# Patient Record
Sex: FEMALE | Race: WHITE | Employment: UNEMPLOYED | URBAN - METROPOLITAN AREA
[De-identification: names, ages, dates, MRNs, and addresses within clinical notes are randomized per-mention and may not be internally consistent; named-entity substitution may affect disease eponyms.]

---

## 2017-08-16 ENCOUNTER — HOSPITAL ENCOUNTER (EMERGENCY)
Age: 27
Discharge: HOME | End: 2017-08-16
Attending: EMERGENCY MEDICINE
Payer: COMMERCIAL

## 2017-08-16 VITALS
SYSTOLIC BLOOD PRESSURE: 126 MMHG | HEART RATE: 85 BPM | RESPIRATION RATE: 14 BRPM | HEIGHT: 63 IN | DIASTOLIC BLOOD PRESSURE: 89 MMHG | WEIGHT: 230 LBS | TEMPERATURE: 97 F | BODY MASS INDEX: 40.75 KG/M2 | OXYGEN SATURATION: 94 %

## 2017-08-16 DIAGNOSIS — N39.0 URINARY TRACT INFECTION WITH HEMATURIA, SITE UNSPECIFIED: Primary | ICD-10-CM

## 2017-08-16 DIAGNOSIS — R31.9 URINARY TRACT INFECTION WITH HEMATURIA, SITE UNSPECIFIED: Primary | ICD-10-CM

## 2017-08-16 LAB
-: ABNORMAL
AMORPHOUS: ABNORMAL
BACTERIA: ABNORMAL
BILIRUBIN URINE: NEGATIVE
CASTS UA: ABNORMAL /LPF (ref 0–8)
COLOR: ABNORMAL
COMMENT UA: ABNORMAL
CRYSTALS, UA: ABNORMAL /HPF
EPITHELIAL CELLS UA: ABNORMAL /HPF (ref 0–5)
GLUCOSE URINE: NEGATIVE
HCG(URINE) PREGNANCY TEST: NEGATIVE
KETONES, URINE: NEGATIVE
LEUKOCYTE ESTERASE, URINE: ABNORMAL
MUCUS: ABNORMAL
NITRITE, URINE: POSITIVE
OTHER OBSERVATIONS UA: ABNORMAL
PH UA: 5 (ref 5–8)
PROTEIN UA: ABNORMAL
RBC UA: ABNORMAL /HPF (ref 0–4)
RENAL EPITHELIAL, UA: ABNORMAL /HPF
SPECIFIC GRAVITY UA: 1.02 (ref 1–1.03)
TRICHOMONAS: ABNORMAL
TURBIDITY: ABNORMAL
URINE HGB: ABNORMAL
UROBILINOGEN, URINE: NORMAL
WBC UA: ABNORMAL /HPF (ref 0–5)
YEAST: ABNORMAL

## 2017-08-16 PROCEDURE — 87077 CULTURE AEROBIC IDENTIFY: CPT

## 2017-08-16 PROCEDURE — 81001 URINALYSIS AUTO W/SCOPE: CPT

## 2017-08-16 PROCEDURE — 99283 EMERGENCY DEPT VISIT LOW MDM: CPT

## 2017-08-16 PROCEDURE — 86403 PARTICLE AGGLUT ANTBDY SCRN: CPT

## 2017-08-16 PROCEDURE — 84703 CHORIONIC GONADOTROPIN ASSAY: CPT

## 2017-08-16 PROCEDURE — 6370000000 HC RX 637 (ALT 250 FOR IP): Performed by: EMERGENCY MEDICINE

## 2017-08-16 PROCEDURE — 87086 URINE CULTURE/COLONY COUNT: CPT

## 2017-08-16 RX ORDER — PHENAZOPYRIDINE HYDROCHLORIDE 100 MG/1
100 TABLET, FILM COATED ORAL 3 TIMES DAILY PRN
COMMUNITY
End: 2017-08-16

## 2017-08-16 RX ORDER — FENOFIBRATE 160 MG/1
160 TABLET ORAL DAILY
COMMUNITY

## 2017-08-16 RX ORDER — CEPHALEXIN 500 MG/1
500 CAPSULE ORAL ONCE
Status: COMPLETED | OUTPATIENT
Start: 2017-08-16 | End: 2017-08-16

## 2017-08-16 RX ORDER — CEPHALEXIN 500 MG/1
500 CAPSULE ORAL 2 TIMES DAILY
Qty: 14 CAPSULE | Refills: 0 | Status: SHIPPED | OUTPATIENT
Start: 2017-08-16 | End: 2017-08-23

## 2017-08-16 RX ORDER — PHENAZOPYRIDINE HYDROCHLORIDE 100 MG/1
100 TABLET, FILM COATED ORAL 3 TIMES DAILY PRN
Qty: 10 TABLET | Refills: 0 | Status: SHIPPED | OUTPATIENT
Start: 2017-08-16 | End: 2017-09-27

## 2017-08-16 RX ADMIN — CEPHALEXIN 500 MG: 500 CAPSULE ORAL at 04:16

## 2017-08-16 ASSESSMENT — PAIN DESCRIPTION - DESCRIPTORS: DESCRIPTORS: DISCOMFORT

## 2017-08-16 ASSESSMENT — PAIN SCALES - GENERAL: PAINLEVEL_OUTOF10: 6

## 2017-08-16 ASSESSMENT — PAIN DESCRIPTION - PAIN TYPE: TYPE: ACUTE PAIN

## 2017-08-17 ASSESSMENT — ENCOUNTER SYMPTOMS
COLOR CHANGE: 0
NAUSEA: 0
BACK PAIN: 0
DIARRHEA: 0
CONSTIPATION: 0
ABDOMINAL PAIN: 0
VOMITING: 0

## 2017-08-18 LAB
CULTURE: ABNORMAL
Lab: ABNORMAL
SPECIMEN DESCRIPTION: ABNORMAL
STATUS: ABNORMAL

## 2017-09-27 ENCOUNTER — HOSPITAL ENCOUNTER (EMERGENCY)
Age: 27
Discharge: HOME | End: 2017-09-27
Attending: EMERGENCY MEDICINE
Payer: COMMERCIAL

## 2017-09-27 VITALS
HEART RATE: 102 BPM | RESPIRATION RATE: 14 BRPM | WEIGHT: 230 LBS | DIASTOLIC BLOOD PRESSURE: 89 MMHG | SYSTOLIC BLOOD PRESSURE: 122 MMHG | TEMPERATURE: 98.6 F | BODY MASS INDEX: 40.74 KG/M2 | OXYGEN SATURATION: 96 %

## 2017-09-27 DIAGNOSIS — N39.0 URINARY TRACT INFECTION WITH HEMATURIA, SITE UNSPECIFIED: Primary | ICD-10-CM

## 2017-09-27 DIAGNOSIS — R31.9 URINARY TRACT INFECTION WITH HEMATURIA, SITE UNSPECIFIED: Primary | ICD-10-CM

## 2017-09-27 LAB
-: ABNORMAL
AMORPHOUS: ABNORMAL
BACTERIA: ABNORMAL
BILIRUBIN URINE: NEGATIVE
CASTS UA: ABNORMAL /LPF (ref 0–8)
COLOR: ABNORMAL
COMMENT UA: ABNORMAL
CRYSTALS, UA: ABNORMAL /HPF
EPITHELIAL CELLS UA: ABNORMAL /HPF (ref 0–5)
GLUCOSE URINE: NEGATIVE
HCG(URINE) PREGNANCY TEST: NEGATIVE
KETONES, URINE: NEGATIVE
LEUKOCYTE ESTERASE, URINE: ABNORMAL
MUCUS: ABNORMAL
NITRITE, URINE: POSITIVE
OTHER OBSERVATIONS UA: ABNORMAL
PH UA: 5.5 (ref 5–8)
PROTEIN UA: NEGATIVE
RBC UA: ABNORMAL /HPF (ref 0–4)
RENAL EPITHELIAL, UA: ABNORMAL /HPF
SPECIFIC GRAVITY UA: 1.01 (ref 1–1.03)
TRICHOMONAS: ABNORMAL
TURBIDITY: ABNORMAL
URINE HGB: ABNORMAL
UROBILINOGEN, URINE: NORMAL
WBC UA: ABNORMAL /HPF (ref 0–5)
YEAST: ABNORMAL

## 2017-09-27 PROCEDURE — 99283 EMERGENCY DEPT VISIT LOW MDM: CPT

## 2017-09-27 PROCEDURE — 87086 URINE CULTURE/COLONY COUNT: CPT

## 2017-09-27 PROCEDURE — 81001 URINALYSIS AUTO W/SCOPE: CPT

## 2017-09-27 PROCEDURE — 84703 CHORIONIC GONADOTROPIN ASSAY: CPT

## 2017-09-27 PROCEDURE — 87186 SC STD MICRODIL/AGAR DIL: CPT

## 2017-09-27 PROCEDURE — 87077 CULTURE AEROBIC IDENTIFY: CPT

## 2017-09-27 PROCEDURE — 86403 PARTICLE AGGLUT ANTBDY SCRN: CPT

## 2017-09-27 PROCEDURE — 6370000000 HC RX 637 (ALT 250 FOR IP): Performed by: EMERGENCY MEDICINE

## 2017-09-27 RX ORDER — PHENAZOPYRIDINE HYDROCHLORIDE 100 MG/1
200 TABLET, FILM COATED ORAL ONCE
Status: COMPLETED | OUTPATIENT
Start: 2017-09-27 | End: 2017-09-27

## 2017-09-27 RX ORDER — PHENAZOPYRIDINE HYDROCHLORIDE 200 MG/1
200 TABLET, FILM COATED ORAL 3 TIMES DAILY PRN
Qty: 6 TABLET | Refills: 0 | Status: SHIPPED | OUTPATIENT
Start: 2017-09-27 | End: 2017-09-30

## 2017-09-27 RX ORDER — NITROFURANTOIN 25; 75 MG/1; MG/1
100 CAPSULE ORAL 2 TIMES DAILY
Qty: 10 CAPSULE | Refills: 0 | Status: SHIPPED | OUTPATIENT
Start: 2017-09-27 | End: 2017-10-02

## 2017-09-27 RX ORDER — NITROFURANTOIN 25; 75 MG/1; MG/1
100 CAPSULE ORAL ONCE
Status: COMPLETED | OUTPATIENT
Start: 2017-09-27 | End: 2017-09-27

## 2017-09-27 RX ADMIN — PHENAZOPYRIDINE HYDROCHLORIDE 200 MG: 100 TABLET ORAL at 04:37

## 2017-09-27 RX ADMIN — NITROFURANTOIN MONOHYDRATE AND NITROFURANTOIN MACROCRYSTALLINE 100 MG: 75; 25 CAPSULE ORAL at 04:37

## 2017-09-27 ASSESSMENT — ENCOUNTER SYMPTOMS
NAUSEA: 0
COUGH: 0
DIARRHEA: 0
SHORTNESS OF BREATH: 0
ABDOMINAL PAIN: 0
BACK PAIN: 0
CONSTIPATION: 0
VOMITING: 0
BLOOD IN STOOL: 0

## 2017-09-29 LAB
CULTURE: ABNORMAL
Lab: ABNORMAL
ORGANISM: ABNORMAL
SPECIMEN DESCRIPTION: ABNORMAL
STATUS: ABNORMAL

## 2018-04-21 ENCOUNTER — HOSPITAL ENCOUNTER (EMERGENCY)
Age: 28
Discharge: HOME | End: 2018-04-22
Payer: COMMERCIAL

## 2018-04-21 VITALS
SYSTOLIC BLOOD PRESSURE: 107 MMHG | HEART RATE: 95 BPM | TEMPERATURE: 97.8 F | BODY MASS INDEX: 42.1 KG/M2 | RESPIRATION RATE: 16 BRPM | WEIGHT: 237.6 LBS | HEIGHT: 63 IN | DIASTOLIC BLOOD PRESSURE: 65 MMHG | OXYGEN SATURATION: 100 %

## 2018-04-21 DIAGNOSIS — N39.0 URINARY TRACT INFECTION WITHOUT HEMATURIA, SITE UNSPECIFIED: ICD-10-CM

## 2018-04-21 DIAGNOSIS — R11.0 NAUSEA: Primary | ICD-10-CM

## 2018-04-21 LAB
-: ABNORMAL
ABSOLUTE EOS #: 0.1 K/UL (ref 0–0.4)
ABSOLUTE IMMATURE GRANULOCYTE: ABNORMAL K/UL (ref 0–0.3)
ABSOLUTE LYMPH #: 3.5 K/UL (ref 1–4.8)
ABSOLUTE MONO #: 0.9 K/UL (ref 0.2–0.8)
ALBUMIN SERPL-MCNC: 4.2 G/DL (ref 3.5–5.2)
ALBUMIN/GLOBULIN RATIO: ABNORMAL (ref 1–2.5)
ALP BLD-CCNC: 36 U/L (ref 35–104)
ALT SERPL-CCNC: 25 U/L (ref 5–33)
AMORPHOUS: ABNORMAL
AMYLASE: 22 U/L (ref 28–100)
ANION GAP SERPL CALCULATED.3IONS-SCNC: 12 MMOL/L (ref 9–17)
AST SERPL-CCNC: 17 U/L
BACTERIA: ABNORMAL
BASOPHILS # BLD: 1 % (ref 0–2)
BASOPHILS ABSOLUTE: 0.1 K/UL (ref 0–0.2)
BILIRUB SERPL-MCNC: 0.16 MG/DL (ref 0.3–1.2)
BILIRUBIN URINE: NEGATIVE
BILIRUBIN, POC: NORMAL
BLOOD URINE, POC: NORMAL
BUN BLDV-MCNC: 15 MG/DL (ref 6–20)
BUN/CREAT BLD: 21 (ref 9–20)
CALCIUM SERPL-MCNC: 8.9 MG/DL (ref 8.6–10.4)
CASTS UA: ABNORMAL /LPF
CHLORIDE BLD-SCNC: 102 MMOL/L (ref 98–107)
CHP ED QC CHECK: YES
CHP ED QC CHECK: YES
CLARITY, POC: NORMAL
CO2: 25 MMOL/L (ref 20–31)
COLOR, POC: YELLOW
COLOR: YELLOW
COMMENT UA: ABNORMAL
CREAT SERPL-MCNC: 0.7 MG/DL (ref 0.5–0.9)
CRYSTALS, UA: ABNORMAL /HPF
DIFFERENTIAL TYPE: ABNORMAL
EOSINOPHILS RELATIVE PERCENT: 1 % (ref 1–4)
EPITHELIAL CELLS UA: ABNORMAL /HPF (ref 0–5)
GFR AFRICAN AMERICAN: >60 ML/MIN
GFR NON-AFRICAN AMERICAN: >60 ML/MIN
GFR SERPL CREATININE-BSD FRML MDRD: ABNORMAL ML/MIN/{1.73_M2}
GFR SERPL CREATININE-BSD FRML MDRD: ABNORMAL ML/MIN/{1.73_M2}
GLUCOSE BLD-MCNC: 107 MG/DL (ref 70–99)
GLUCOSE URINE, POC: NORMAL
GLUCOSE URINE: NEGATIVE
HCT VFR BLD CALC: 42.9 % (ref 36–46)
HEMOGLOBIN: 14.2 G/DL (ref 12–16)
IMMATURE GRANULOCYTES: ABNORMAL %
KETONES, POC: NORMAL
KETONES, URINE: NEGATIVE
LEUKOCYTE EST, POC: NORMAL
LEUKOCYTE ESTERASE, URINE: NEGATIVE
LIPASE: 17 U/L (ref 13–60)
LYMPHOCYTES # BLD: 32 % (ref 24–44)
MCH RBC QN AUTO: 30.3 PG (ref 26–34)
MCHC RBC AUTO-ENTMCNC: 33.2 G/DL (ref 31–37)
MCV RBC AUTO: 91.2 FL (ref 80–100)
MONOCYTES # BLD: 8 % (ref 1–7)
MUCUS: ABNORMAL
NITRITE, POC: POSITIVE
NITRITE, URINE: POSITIVE
NRBC AUTOMATED: ABNORMAL PER 100 WBC
OTHER OBSERVATIONS UA: ABNORMAL
PDW BLD-RTO: 12 % (ref 11.5–14.5)
PH UA: 6.5 (ref 5–8)
PH, POC: 7
PLATELET # BLD: 321 K/UL (ref 130–400)
PLATELET ESTIMATE: ABNORMAL
PMV BLD AUTO: ABNORMAL FL (ref 6–12)
POTASSIUM SERPL-SCNC: 3.8 MMOL/L (ref 3.7–5.3)
PREGNANCY TEST URINE, POC: NORMAL
PROTEIN UA: NEGATIVE
PROTEIN, POC: NORMAL
RBC # BLD: 4.71 M/UL (ref 4–5.2)
RBC # BLD: ABNORMAL 10*6/UL
RBC UA: ABNORMAL /HPF (ref 0–2)
RENAL EPITHELIAL, UA: ABNORMAL /HPF
SEG NEUTROPHILS: 58 % (ref 36–66)
SEGMENTED NEUTROPHILS ABSOLUTE COUNT: 6.2 K/UL (ref 1.8–7.7)
SODIUM BLD-SCNC: 139 MMOL/L (ref 135–144)
SPECIFIC GRAVITY UA: 1.02 (ref 1–1.03)
SPECIFIC GRAVITY, POC: 1.03
TOTAL PROTEIN: 7.3 G/DL (ref 6.4–8.3)
TRICHOMONAS: ABNORMAL
TURBIDITY: CLEAR
URINE HGB: NEGATIVE
UROBILINOGEN, POC: 1
UROBILINOGEN, URINE: NORMAL
WBC # BLD: 10.8 K/UL (ref 3.5–11)
WBC # BLD: ABNORMAL 10*3/UL
WBC UA: ABNORMAL /HPF (ref 0–5)
YEAST: ABNORMAL

## 2018-04-21 PROCEDURE — 85025 COMPLETE CBC W/AUTO DIFF WBC: CPT

## 2018-04-21 PROCEDURE — 84703 CHORIONIC GONADOTROPIN ASSAY: CPT

## 2018-04-21 PROCEDURE — 87077 CULTURE AEROBIC IDENTIFY: CPT

## 2018-04-21 PROCEDURE — 99283 EMERGENCY DEPT VISIT LOW MDM: CPT

## 2018-04-21 PROCEDURE — 87086 URINE CULTURE/COLONY COUNT: CPT

## 2018-04-21 PROCEDURE — 82150 ASSAY OF AMYLASE: CPT

## 2018-04-21 PROCEDURE — 81001 URINALYSIS AUTO W/SCOPE: CPT

## 2018-04-21 PROCEDURE — 6360000002 HC RX W HCPCS: Performed by: PHYSICIAN ASSISTANT

## 2018-04-21 PROCEDURE — 83690 ASSAY OF LIPASE: CPT

## 2018-04-21 PROCEDURE — 80053 COMPREHEN METABOLIC PANEL: CPT

## 2018-04-21 PROCEDURE — 87186 SC STD MICRODIL/AGAR DIL: CPT

## 2018-04-21 RX ORDER — ONDANSETRON 4 MG/1
4 TABLET, ORALLY DISINTEGRATING ORAL EVERY 8 HOURS PRN
Qty: 20 TABLET | Refills: 0 | Status: ON HOLD | OUTPATIENT
Start: 2018-04-21 | End: 2021-08-11

## 2018-04-21 RX ORDER — ONDANSETRON 4 MG/1
4 TABLET, ORALLY DISINTEGRATING ORAL ONCE
Status: COMPLETED | OUTPATIENT
Start: 2018-04-21 | End: 2018-04-21

## 2018-04-21 RX ORDER — CIPROFLOXACIN 500 MG/1
500 TABLET, FILM COATED ORAL 2 TIMES DAILY
Qty: 20 TABLET | Refills: 0 | Status: SHIPPED | OUTPATIENT
Start: 2018-04-21 | End: 2018-05-01

## 2018-04-21 RX ADMIN — ONDANSETRON 4 MG: 4 TABLET, ORALLY DISINTEGRATING ORAL at 23:04

## 2018-04-22 RX ORDER — FLUCONAZOLE 150 MG/1
150 TABLET ORAL ONCE
Qty: 1 TABLET | Refills: 1 | Status: SHIPPED | OUTPATIENT
Start: 2018-04-22 | End: 2018-04-22

## 2018-04-23 LAB
CULTURE: ABNORMAL
CULTURE: ABNORMAL
HCG, PREGNANCY URINE (POC): NEGATIVE
Lab: ABNORMAL
ORGANISM: ABNORMAL
SPECIMEN DESCRIPTION: ABNORMAL
SPECIMEN DESCRIPTION: ABNORMAL
STATUS: ABNORMAL

## 2019-04-14 ENCOUNTER — HOSPITAL ENCOUNTER (EMERGENCY)
Age: 29
Discharge: HOME | End: 2019-04-14
Attending: EMERGENCY MEDICINE
Payer: COMMERCIAL

## 2019-04-14 VITALS
HEART RATE: 98 BPM | DIASTOLIC BLOOD PRESSURE: 89 MMHG | OXYGEN SATURATION: 99 % | BODY MASS INDEX: 43.41 KG/M2 | TEMPERATURE: 97.7 F | WEIGHT: 245 LBS | SYSTOLIC BLOOD PRESSURE: 125 MMHG | HEIGHT: 63 IN | RESPIRATION RATE: 19 BRPM

## 2019-04-14 DIAGNOSIS — R10.9 RIGHT FLANK PAIN: Primary | ICD-10-CM

## 2019-04-14 LAB
-: ABNORMAL
AMORPHOUS: ABNORMAL
BACTERIA: ABNORMAL
BILIRUBIN URINE: NEGATIVE
CASTS UA: ABNORMAL /LPF (ref 0–8)
COLOR: YELLOW
CRYSTALS, UA: ABNORMAL /HPF
EPITHELIAL CELLS UA: ABNORMAL /HPF (ref 0–5)
GLUCOSE URINE: NEGATIVE
HCG(URINE) PREGNANCY TEST: NEGATIVE
KETONES, URINE: NEGATIVE
LEUKOCYTE ESTERASE, URINE: ABNORMAL
MUCUS: ABNORMAL
NITRITE, URINE: NEGATIVE
OTHER OBSERVATIONS UA: ABNORMAL
PH UA: 7 (ref 5–8)
PROTEIN UA: NEGATIVE
RBC UA: ABNORMAL /HPF (ref 0–4)
RENAL EPITHELIAL, UA: ABNORMAL /HPF
SPECIFIC GRAVITY UA: 1.02 (ref 1–1.03)
TRICHOMONAS: ABNORMAL
TURBIDITY: ABNORMAL
URINE HGB: NEGATIVE
UROBILINOGEN, URINE: NORMAL
WBC UA: ABNORMAL /HPF (ref 0–5)
YEAST: ABNORMAL

## 2019-04-14 PROCEDURE — 81001 URINALYSIS AUTO W/SCOPE: CPT

## 2019-04-14 PROCEDURE — 84703 CHORIONIC GONADOTROPIN ASSAY: CPT

## 2019-04-14 PROCEDURE — 99284 EMERGENCY DEPT VISIT MOD MDM: CPT

## 2019-04-14 PROCEDURE — 87086 URINE CULTURE/COLONY COUNT: CPT

## 2019-04-14 RX ORDER — CEPHALEXIN 500 MG/1
500 CAPSULE ORAL 2 TIMES DAILY
Qty: 20 CAPSULE | Refills: 0 | Status: SHIPPED | OUTPATIENT
Start: 2019-04-14 | End: 2019-04-24

## 2019-04-14 ASSESSMENT — PAIN DESCRIPTION - ORIENTATION: ORIENTATION: RIGHT

## 2019-04-14 ASSESSMENT — PAIN DESCRIPTION - LOCATION: LOCATION: FLANK

## 2019-04-14 ASSESSMENT — PAIN DESCRIPTION - PROGRESSION: CLINICAL_PROGRESSION: NOT CHANGED

## 2019-04-14 ASSESSMENT — PAIN DESCRIPTION - DESCRIPTORS: DESCRIPTORS: ACHING;BURNING

## 2019-04-14 ASSESSMENT — ENCOUNTER SYMPTOMS
COUGH: 0
BLOOD IN STOOL: 0
CONSTIPATION: 0
WHEEZING: 0
NAUSEA: 0
BACK PAIN: 0
ABDOMINAL PAIN: 0
SHORTNESS OF BREATH: 0
VOMITING: 0
SORE THROAT: 0
DIARRHEA: 0

## 2019-04-14 ASSESSMENT — PAIN DESCRIPTION - PAIN TYPE: TYPE: ACUTE PAIN

## 2019-04-14 ASSESSMENT — PAIN SCALES - GENERAL: PAINLEVEL_OUTOF10: 8

## 2019-04-14 ASSESSMENT — PAIN DESCRIPTION - FREQUENCY: FREQUENCY: CONTINUOUS

## 2019-04-14 ASSESSMENT — PAIN DESCRIPTION - ONSET: ONSET: GRADUAL

## 2019-04-15 ENCOUNTER — HOSPITAL ENCOUNTER (EMERGENCY)
Age: 29
Discharge: HOME | End: 2019-04-15
Attending: EMERGENCY MEDICINE
Payer: COMMERCIAL

## 2019-04-15 VITALS
SYSTOLIC BLOOD PRESSURE: 135 MMHG | OXYGEN SATURATION: 99 % | TEMPERATURE: 97.2 F | DIASTOLIC BLOOD PRESSURE: 94 MMHG | RESPIRATION RATE: 17 BRPM | HEART RATE: 100 BPM

## 2019-04-15 DIAGNOSIS — S39.012A STRAIN OF LUMBAR REGION, INITIAL ENCOUNTER: Primary | ICD-10-CM

## 2019-04-15 DIAGNOSIS — N30.01 ACUTE CYSTITIS WITH HEMATURIA: ICD-10-CM

## 2019-04-15 LAB
-: ABNORMAL
AMORPHOUS: ABNORMAL
BACTERIA: ABNORMAL
BILIRUBIN URINE: NEGATIVE
CASTS UA: ABNORMAL /LPF (ref 0–8)
COLOR: YELLOW
COMMENT UA: ABNORMAL
CRYSTALS, UA: ABNORMAL /HPF
CULTURE: NORMAL
EPITHELIAL CELLS UA: ABNORMAL /HPF (ref 0–5)
GLUCOSE URINE: NEGATIVE
KETONES, URINE: NEGATIVE
LEUKOCYTE ESTERASE, URINE: ABNORMAL
Lab: NORMAL
MUCUS: ABNORMAL
NITRITE, URINE: NEGATIVE
OTHER OBSERVATIONS UA: ABNORMAL
PH UA: 6.5 (ref 5–8)
PROTEIN UA: ABNORMAL
RBC UA: ABNORMAL /HPF (ref 0–4)
RENAL EPITHELIAL, UA: ABNORMAL /HPF
SPECIFIC GRAVITY UA: 1.01 (ref 1–1.03)
SPECIMEN DESCRIPTION: NORMAL
TRICHOMONAS: ABNORMAL
TURBIDITY: ABNORMAL
URINE HGB: ABNORMAL
UROBILINOGEN, URINE: NORMAL
WBC UA: ABNORMAL /HPF (ref 0–5)
YEAST: ABNORMAL

## 2019-04-15 PROCEDURE — 6370000000 HC RX 637 (ALT 250 FOR IP): Performed by: PHYSICIAN ASSISTANT

## 2019-04-15 PROCEDURE — 99283 EMERGENCY DEPT VISIT LOW MDM: CPT

## 2019-04-15 PROCEDURE — 87086 URINE CULTURE/COLONY COUNT: CPT

## 2019-04-15 PROCEDURE — 81001 URINALYSIS AUTO W/SCOPE: CPT

## 2019-04-15 RX ORDER — IBUPROFEN 800 MG/1
800 TABLET ORAL ONCE
Status: COMPLETED | OUTPATIENT
Start: 2019-04-15 | End: 2019-04-15

## 2019-04-15 RX ORDER — IBUPROFEN 800 MG/1
800 TABLET ORAL EVERY 8 HOURS PRN
Qty: 30 TABLET | Refills: 0 | Status: SHIPPED | OUTPATIENT
Start: 2019-04-15

## 2019-04-15 RX ORDER — CEPHALEXIN 250 MG/1
500 CAPSULE ORAL ONCE
Status: COMPLETED | OUTPATIENT
Start: 2019-04-15 | End: 2019-04-15

## 2019-04-15 RX ORDER — CYCLOBENZAPRINE HCL 10 MG
10 TABLET ORAL NIGHTLY PRN
Qty: 15 TABLET | Refills: 0 | Status: SHIPPED | OUTPATIENT
Start: 2019-04-15 | End: 2019-04-25

## 2019-04-15 RX ORDER — CYCLOBENZAPRINE HCL 10 MG
10 TABLET ORAL ONCE
Status: COMPLETED | OUTPATIENT
Start: 2019-04-15 | End: 2019-04-15

## 2019-04-15 RX ADMIN — CEPHALEXIN 500 MG: 250 CAPSULE ORAL at 22:12

## 2019-04-15 RX ADMIN — IBUPROFEN 800 MG: 800 TABLET, FILM COATED ORAL at 22:12

## 2019-04-15 RX ADMIN — CYCLOBENZAPRINE HYDROCHLORIDE 10 MG: 10 TABLET, FILM COATED ORAL at 22:12

## 2019-04-15 ASSESSMENT — PAIN DESCRIPTION - PAIN TYPE: TYPE: ACUTE PAIN

## 2019-04-15 ASSESSMENT — PAIN DESCRIPTION - DESCRIPTORS: DESCRIPTORS: CRAMPING

## 2019-04-15 ASSESSMENT — PAIN DESCRIPTION - LOCATION: LOCATION: BACK

## 2019-04-15 ASSESSMENT — PAIN SCALES - GENERAL
PAINLEVEL_OUTOF10: 10
PAINLEVEL_OUTOF10: 10

## 2019-04-15 ASSESSMENT — PAIN DESCRIPTION - ORIENTATION: ORIENTATION: RIGHT;LEFT

## 2019-04-15 NOTE — ED PROVIDER NOTES
[facility]     Emergency Department     Faculty Attestation    I performed a history and physical examination of the patient and discussed management with the resident. I reviewed the residents note and agree with the documented findings and plan of care. Any areas of disagreement are noted on the chart. I was personally present for the key portions of any procedures. I have documented in the chart those procedures where I was not present during the key portions. I have reviewed the emergency nurses triage note. I agree with the chief complaint, past medical history, past surgical history, allergies, medications, social and family history as documented unless otherwise noted below. For Physician Assistant/ Nurse Practitioner cases/documentation I have personally evaluated this patient and have completed at least one if not all key elements of the E/M (history, physical exam, and MDM). Additional findings are as noted. Patient presents complaining of right-sided flank pain that started today. Patient is currently being treated with Cipro for a UTI. She denies fever, chills, nausea or vomiting. Patient says that her dysuria has resolved. On exam, patient is resting comfortably in the bed. Lungs clear to auscultation bilaterally and heart sounds are normal.  Abdomen is soft and nontender. No rebound or guarding is present. There is mild right CVA tenderness.   We'll check a urinalysis and pregnancy and reassess      Frankie Rider MD  Attending Emergency  Physician              Ronald Canela MD  04/14/19 3914

## 2019-04-15 NOTE — PROGRESS NOTES
31 yo F w/ hx of recurrent UTI, PCOS, PID (2014), and HSV with current active lesions presenting with R flank pain x1day. Pt reports that this morning she experienced intermittent 8/10 non-radiating R flank pain. She is currently being treated for a UTI with cipro (started on 04/08/19). She denies fever, chills, N/V/D, urinary frequency, vaginal discharge, and hematuria. She denies any hx of nephrolithiasis. She reports 1 PPD tobacco use but denies alcohol and drug use. Reported LMP was 2-3 years ago. Pt reports last pap smear in January was normal however she was found to have bacterial vaginosis at the time and was treated accordingly.

## 2019-04-15 NOTE — ED NOTES
Pt up to use the bathroom at this time  Urine sent to lab via tube system       Tanner Mukherjee RN  04/14/19 1560

## 2019-04-15 NOTE — ED PROVIDER NOTES
[facility] ED  Emergency Department Encounter  EmergencyMedicine Resident     Pt Maryjo Genao  MRN: [id number]  [place] 1990  Date of evaluation: 4/14/19  PCP:  HILDA Root CNP    CHIEF COMPLAINT       Chief Complaint   Patient presents with    Flank Pain       HISTORY OF PRESENT ILLNESS  (Location/Symptom, Timing/Onset, Context/Setting, Quality, Duration, Modifying Factors, Severity.)      Diana Griffin is a 30 y.o. female who presents with primary complaint that she is having some right flank pain. Approximately a week ago she was seen by her PCP who diagnosed her with urinary tract infection and she was started on Cipro. At that time she was having suprapubic tenderness. She denies any dysuria or increased frequency. She denies any hematuria or vaginal discharge. She does have to herpetic lesion that is known. Bedside ultrasound doesn't reveal any acute hydronephrosis. Patient admits to mild more cramping pain not sharp all achy pain. Patient denies any abdominal pain. Patient admits the suprapubic pain has improved since starting the Cipro. Main reason she presents today is because she is now having some right flank pain. Patient denies any fevers or chills. Vitals are stable she is in no acute distress. She denies any shortness of breath chest pain or abdominal pain. PAST MEDICAL / SURGICAL / SOCIAL / FAMILY HISTORY      has a past medical history of Depression, Herpes, PCOS (polycystic ovarian syndrome), PID, and STD (female). has a past surgical history that includes Tonsillectomy and adenoidectomy (1994).     Social History     Socioeconomic History    Marital status:      Spouse name: Not on file    Number of children: Not on file    Years of education: Not on file    Highest education level: Not on file   Occupational History    Not on file   Social Needs    Financial resource strain: Not on file    Food insecurity: Worry: Not on file     Inability: Not on file    Transportation needs:     Medical: Not on file     Non-medical: Not on file   Tobacco Use    Smoking status: Current Every Day Smoker     Packs/day: 1.00     Types: Cigarettes    Smokeless tobacco: Never Used   Substance and Sexual Activity    Alcohol use: Yes     Comment: social    Drug use: No    Sexual activity: Yes     Partners: Male     Comment: Doesn't use condoms, has been on depo in the past.    Lifestyle    Physical activity:     Days per week: Not on file     Minutes per session: Not on file    Stress: Not on file   Relationships    Social connections:     Talks on phone: Not on file     Gets together: Not on file     Attends Restoration service: Not on file     Active member of club or organization: Not on file     Attends meetings of clubs or organizations: Not on file     Relationship status: Not on file    Intimate partner violence:     Fear of current or ex partner: Not on file     Emotionally abused: Not on file     Physically abused: Not on file     Forced sexual activity: Not on file   Other Topics Concern    Not on file   Social History Narrative    Lives with boyfriend. Works at [employer] Finished 9th grade. Family History   Problem Relation Age of Onset    Stroke Paternal Grandmother         TIA    Diabetes Paternal Grandmother     Diabetes Maternal Grandmother     Breast Cancer Neg Hx     Colon Cancer Neg Hx     Uterine Cancer Neg Hx     Ovarian Cancer Neg Hx     Clotting Disorder Neg Hx        Allergies:  Amoxicillin and Benadryl [diphenhydramine]    Home Medications:  Prior to Admission medications    Medication Sig Start Date End Date Taking?  Authorizing Provider   cephALEXin (KEFLEX) 500 MG capsule Take 1 capsule by mouth 2 times daily for 10 days 4/14/19 4/24/19 Yes Rebecca Dietrich, DO   ondansetron (ZOFRAN ODT) 4 MG disintegrating tablet Take 1 tablet by mouth every 8 hours as needed for Nausea 4/21/18   Shekhar Lombardi PA-C   ATORVASTATIN CALCIUM PO Take by mouth    Historical Provider, MD   fenofibrate 160 MG tablet Take 160 mg by mouth daily    Historical Provider, MD       REVIEW OF SYSTEMS    (2-9 systems for level 4, 10 or more for level 5)      Review of Systems   Constitutional: Negative for diaphoresis and fever. HENT: Negative for sore throat. Respiratory: Negative for cough, shortness of breath and wheezing. Cardiovascular: Negative for chest pain, palpitations and leg swelling. Gastrointestinal: Negative for abdominal pain, blood in stool, constipation, diarrhea, nausea and vomiting. Genitourinary: Positive for flank pain. Negative for dysuria, frequency and hematuria. Musculoskeletal: Negative for back pain and neck pain. Skin: Negative for rash. Neurological: Negative for dizziness and headaches. Hematological: Does not bruise/bleed easily. PHYSICAL EXAM   (up to 7 for level 4, 8 or more for level 5)      INITIAL VITALS:   /89   Pulse 98   Temp 97.7 °F (36.5 °C) (Oral)   Resp 19   Ht 5' 3\" (1.6 m)   Wt 245 lb (111.1 kg)   SpO2 99%   BMI 43.40 kg/m²     Physical Exam   Constitutional: She is oriented to person, place, and time. She appears well-developed and well-nourished. HENT:   Head: Normocephalic and atraumatic. Eyes: Pupils are equal, round, and reactive to light. Right eye exhibits no discharge. Left eye exhibits no discharge. Neck: Normal range of motion. Cardiovascular: Normal rate, regular rhythm and normal heart sounds. Exam reveals no gallop and no friction rub. No murmur heard. Pulmonary/Chest: No respiratory distress. She has no wheezes. She has no rales. She exhibits no tenderness. Abdominal: Soft. Bowel sounds are normal. She exhibits no distension and no mass. There is no tenderness. There is no rebound and no guarding.    Genitourinary:   Genitourinary Comments: Patient has some minor CVA tenderness on the right side. With some minor flank pain. No kilo abdominal pain on exam.   Musculoskeletal: Normal range of motion. She exhibits no edema, tenderness or deformity. Neurological: She is alert and oriented to person, place, and time. Skin: Skin is warm, dry and intact. No rash noted. She is not diaphoretic. No erythema. No pallor. Psychiatric: She has a normal mood and affect. Her speech is normal and behavior is normal. Judgment and thought content normal. Cognition and memory are normal.       DIFFERENTIAL  DIAGNOSIS     PLAN (LABS / Marifer Du / EKG):  Orders Placed This Encounter   Procedures    Urine Culture    Urinalysis with Microscopic    Pregnancy, Urine       MEDICATIONS ORDERED:  Orders Placed This Encounter   Medications    cephALEXin (KEFLEX) 500 MG capsule     Sig: Take 1 capsule by mouth 2 times daily for 10 days     Dispense:  20 capsule     Refill:  0       DDX: Herniated disc, AAA rupture, pyelonephritis, kidney stone, musculoskeletal pain, retroperitoneal hematoma, pancreatitis, splenic rupture, pneumonia         DIAGNOSTIC RESULTS / EMERGENCY DEPARTMENT COURSE / MDM     LABS:  Results for orders placed or performed during the hospital encounter of 04/14/19   Urinalysis with Microscopic   Result Value Ref Range    Color, UA YELLOW YELLOW    Turbidity UA CLOUDY (A) CLEAR    Glucose, Ur NEGATIVE NEGATIVE    Bilirubin Urine NEGATIVE NEGATIVE    Ketones, Urine NEGATIVE NEGATIVE    Specific Gravity, UA 1.016 1.005 - 1.030    Urine Hgb NEGATIVE NEGATIVE    pH, UA 7.0 5.0 - 8.0    Protein, UA NEGATIVE NEGATIVE    Urobilinogen, Urine Normal Normal    Nitrite, Urine NEGATIVE NEGATIVE    Leukocyte Esterase, Urine MODERATE (A) NEGATIVE    -          WBC, UA 10 TO 20 0 - 5 /HPF    RBC, UA None 0 - 4 /HPF    Casts UA  0 - 8 /LPF     0 TO 2 HYALINE Reference range defined for non-centrifuged specimen.     Crystals UA NOT REPORTED None /HPF    Epithelial Cells UA 10 TO 20 0 - 5 /HPF    Renal Epithelial, Urine NOT REPORTED 0 /HPF    Bacteria, UA FEW (A) None    Mucus, UA NOT REPORTED None    Trichomonas, UA NOT REPORTED None    Amorphous, UA NOT REPORTED None    Other Observations UA NOT REPORTED NOT REQ. Yeast, UA NOT REPORTED None   Pregnancy, Urine   Result Value Ref Range    HCG(Urine) Pregnancy Test NEGATIVE NEGATIVE       RADIOLOGY:     RENAL ULTRASOUND:      A limited, bedside ultrasound of the kidneys was performed. The medical necessity was to evaluate for hydronephrosis in a patient with possible renal colic. The structures studied were the kidney, including the renal cortex and collecting system. FINDINGS:    negative for hydronephrosis. The study was technically adequate. IMAGES:  Electronically uploaded to the PACS system        MDM/EMERGENCY DEPARTMENT COURSE:      ED Course as of Apr 14 2153   Sun Apr 14, 2019 2050 Patient has had 6 days of Cipro therapy, her suprapubic tenderness has resolved however she is now having some right flank pain and concern for early pyelo-. Patient's vitals are stable she is afebrile she is not taken any antipyretics. Bedside ultrasound reveals no acute hide her. Lower suspicion for acute nephrolithiasis at this time. Plan for urinalysis including urine pregnancy. We'll potentially add another antibiotic if urinalysis appears acutely infected. [KW]   2146 Plan for treatment with Keflex while patient discontinue the Cipro. If she continues to have urinary tract infection. While patient follow-up in a few days with primary care provider.    [KW]   2147 Patient started to return if she develops fevers, if she develops weakness fatigue. Or is unable to tolerate by mouth. [KW]      ED Course User Index  [KW] Laura Heredia DO         PROCEDURES:  None    CONSULTS:  None    CRITICAL CARE:  None    FINAL IMPRESSION      1.  Right flank pain          DISPOSITION / PLAN     DISPOSITION     PATIENT REFERRED TO:  Martha Leslie, APRN - CNP  [address]  [phone]    Schedule an appointment as soon as possible for a visit   For Follow Up      DISCHARGE MEDICATIONS:  New Prescriptions    CEPHALEXIN (KEFLEX) 500 MG CAPSULE    Take 1 capsule by mouth 2 times daily for 10 days       Richie Miles DO  Emergency Medicine Resident    (Please note that portions of this note were completed with a voicerecognition program.  Efforts were made to edit the dictations but occasionally words are mis-transcribed.)       Richie Miles DO  04/14/19 2150       Richie Miles DO  04/14/19 2152

## 2019-04-15 NOTE — ED NOTES
Pt arrived to ed via self, pt walked to bed. C/O: right flank pain, pt states she was recently diagnosed with a UTI and has been on antibiotics, pt states she has had multiple UTI's but it has never caused flank pain, pt states no N/V/D. No blurry vision, pt states she does have burning with urination. PT NAD at this time, non labored breathing.       Miguel Alejandra RN  04/14/19 2100

## 2019-04-16 LAB
CULTURE: NORMAL
Lab: NORMAL
SPECIMEN DESCRIPTION: NORMAL

## 2019-04-16 NOTE — ED PROVIDER NOTES
[facility]     Emergency Department     Faculty Attestation    I performed a history and physical examination of the patient and discussed management with the resident. I reviewed the residents note and agree with the documented findings and plan of care. Any areas of disagreement are noted on the chart. I was personally present for the key portions of any procedures. I have documented in the chart those procedures where I was not present during the key portions. I have reviewed the emergency nurses triage note. I agree with the chief complaint, past medical history, past surgical history, allergies, medications, social and family history as documented unless otherwise noted below. For Physician Assistant/ Nurse Practitioner cases/documentation I have personally evaluated this patient and have completed at least one if not all key elements of the E/M (history, physical exam, and MDM). Additional findings are as noted. Pain isolated to the left SI joint, no CVA tenderness, abdomen soft and nontender, no radiation of the pain in the lower extremities.       Fani No MD  04/15/19 4079

## 2019-04-16 NOTE — ED PROVIDER NOTES
[address]  ED  eMERGENCY dEPARTMENT eNCOUnter      Pt Name: Bushra Valenzuela  MRN: [id number]  [place] 1990  Date of evaluation: 4/15/2019  Provider: AMINA Hickey    CHIEF COMPLAINT       Chief Complaint   Patient presents with    Back Pain     seen yesterday for a UTI           HISTORY OF PRESENT ILLNESS  (Location/Symptom, Timing/Onset, Context/Setting, Quality, Duration, Modifying Factors, Severity.)   Bushra Valenzuela is a 30 y.o. female who presents to the emergency department complaining of b/l low back pain. States was recently treated with abx by pcp for uti. Was seen here yesterday with same complaint and given keflex but didn't  the rx. States pain is worse and presented by to the ED . Denies any fevers, chills, abd pain, vomiting, c/o mild nausea. Urine preg yesterday was negative. C/o worsening low back pain, worse on right > left      Nursing Notes were reviewed. REVIEW OF SYSTEMS    (2-9 systems for level 4, 10 or more for level 5)     Review of Systems   Constitutional: Negative. HENT: Negative. Eyes: Negative. Respiratory: Negative. Cardiovascular: Negative. Gastrointestinal: mild nausea, no vomiting. Musculoskeletal: Negative. Genitourinary: no dysuria, no vaginal bleeding, no vaginal discharge. Skin: Negative. Except as noted above the remainder of the review of systems was reviewed and negative. PAST MEDICAL HISTORY     Past Medical History:   Diagnosis Date    Depression     No psych treatment or therapy currently, has been seen by psych in the past    Herpes     type 1 and 2     PCOS (polycystic ovarian syndrome) 2005    PID 12/22/2014 12/22/2014 : Treated with out patient therapy (rocephin, doxy, flagyl).  Seen at [facility] ED.     STD (female) 2005    gonorrhea, trich, chlamydia  - all treated     None otherwise stated in nurses notes    SURGICAL HISTORY       Past Surgical History:   Procedure Laterality Date    TONSILLECTOMY AND ADENOIDECTOMY  1994     None otherwise stated in nurses notes    CURRENT MEDICATIONS       Previous Medications    ATORVASTATIN CALCIUM PO    Take by mouth    CEPHALEXIN (KEFLEX) 500 MG CAPSULE    Take 1 capsule by mouth 2 times daily for 10 days    FENOFIBRATE 160 MG TABLET    Take 160 mg by mouth daily    ONDANSETRON (ZOFRAN ODT) 4 MG DISINTEGRATING TABLET    Take 1 tablet by mouth every 8 hours as needed for Nausea       ALLERGIES     Amoxicillin and Benadryl [diphenhydramine]    FAMILY HISTORY           Problem Relation Age of Onset    Stroke Paternal Grandmother         TIA    Diabetes Paternal Grandmother     Diabetes Maternal Grandmother     Breast Cancer Neg Hx     Colon Cancer Neg Hx     Uterine Cancer Neg Hx     Ovarian Cancer Neg Hx     Clotting Disorder Neg Hx      Family Status   Relation Name Status    PGM  (Not Specified)    MGM  (Not Specified)    Neg Hx  (Not Specified)      None otherwise stated in nurses notes    SOCIAL HISTORY      reports that she has been smoking cigarettes. She has been smoking about 1.00 pack per day. She has never used smokeless tobacco. She reports that she drinks alcohol. She reports that she does not use drugs. lives at home with others     PHYSICAL EXAM    (up to 7 for level 4, 8 or more for level 5)     ED Triage Vitals [04/15/19 2058]   BP Temp Temp src Pulse Resp SpO2 Height Weight   (!) 142/100 97.2 °F (36.2 °C) -- 104 17 99 % -- --       Physical Exam   Nursing note and vitals reviewed. Constitutional: Oriented to person, place, and time and well-developed, well-nourished. Head: Normocephalic and atraumatic. Ear: External ears normal.   Nose: Nose normal and midline. Eyes: Conjunctivae and EOM are normal. Pupils are equal, round, and reactive to light. Neck: Normal range of motion. Cardiovascular: Normal rate, regular rhythm, normal heart sounds and intact distal pulses.     Pulmonary/Chest: Effort normal and breath sounds normal. No respiratory distress. No wheezes. No rales. No chest tenderness. Abdominal: Soft. Bowel sounds are normal. No distension and no mass. Mild suprapubic abdominal tenderness. There is no rebound and no guarding. no CVA tenderness bilaterally. Musculoskeletal: Normal range of motion. no midline tenderness. Neurological: Alert and oriented to person, place, and time. Gait normal. GCS score is 15. Skin: Skin is warm and dry. No rash noted. No erythema. No pallor. DIAGNOSTIC RESULTS       LABS:  Labs Reviewed   URINE RT REFLEX TO CULTURE - Abnormal; Notable for the following components:       Result Value    Turbidity UA TURBID (*)     Urine Hgb SMALL (*)     Protein, UA 2+ (*)     Leukocyte Esterase, Urine MODERATE (*)     All other components within normal limits   MICROSCOPIC URINALYSIS - Abnormal; Notable for the following components:    Bacteria, UA MANY (*)     All other components within normal limits   URINE CULTURE       All other labs were within normal range or not returned as of this dictation. EMERGENCY DEPARTMENT COURSE and DIFFERENTIAL DIAGNOSIS/MDM:   Vitals:    Vitals:    04/15/19 2058 04/15/19 2216   BP: (!) 142/100 (!) 135/94   Pulse: 104 100   Resp: 17    Temp: 97.2 °F (36.2 °C)    SpO2: 99%      Patient presents with low back pain, she states that at times it feels as if his muscle spasms. She also has subsequent UTI which has been treated with Cipro   Keflex initiated here in the emergency room, she did receive an prescription for this yesterday but did not follow-up. Instructed to fill this prescription and start tomorrow. Motrin and Flexeril given here low-back spasming, she has a  home. Urine will be sent for culture, patient is instructed to follow-up with family doctor. Pre-hypertension/Hypertension: The patient has been informed that they may have pre-hypertension or Hypertension based on a blood pressure reading in the emergency department.  I

## 2019-04-16 NOTE — ED NOTES
Pt presents to the ER with complaints of bilateral back pain that has been going on for the past few days. Pt was seen for a UTI and given an antibiotic. She had only one pill left and was in a lot of pain yesterday. She came to the ER and was given another antibiotic yesterday because they told her the UTI was still there. Today her pain is very high and motrin isnt helping. Pt is alert and oriented and in 10/10 pain. She is tearful at bedside. Family in room with her.       Lissett Finney RN  04/15/19 7329

## 2019-04-16 NOTE — ED NOTES
Pt resting in bed and denies. Urine sent to lab. Denies the need for anything at this time.       Jeremiah Johnson RN  04/15/19 1549

## 2020-01-05 ENCOUNTER — HOSPITAL ENCOUNTER (EMERGENCY)
Age: 30
Discharge: HOME | End: 2020-01-05
Attending: EMERGENCY MEDICINE
Payer: COMMERCIAL

## 2020-01-05 VITALS
TEMPERATURE: 97.9 F | RESPIRATION RATE: 18 BRPM | HEART RATE: 72 BPM | HEIGHT: 67 IN | BODY MASS INDEX: 31.39 KG/M2 | SYSTOLIC BLOOD PRESSURE: 139 MMHG | DIASTOLIC BLOOD PRESSURE: 82 MMHG | OXYGEN SATURATION: 98 % | WEIGHT: 200 LBS

## 2020-01-05 PROCEDURE — 93970 EXTREMITY STUDY: CPT

## 2020-01-05 PROCEDURE — 99283 EMERGENCY DEPT VISIT LOW MDM: CPT

## 2020-01-05 RX ORDER — IBUPROFEN 600 MG/1
600 TABLET ORAL EVERY 6 HOURS PRN
Qty: 120 TABLET | Refills: 0 | Status: SHIPPED | OUTPATIENT
Start: 2020-01-05

## 2020-01-05 ASSESSMENT — PAIN DESCRIPTION - DESCRIPTORS: DESCRIPTORS: CONSTANT;THROBBING

## 2020-01-05 ASSESSMENT — PAIN SCALES - WONG BAKER: WONGBAKER_NUMERICALRESPONSE: 8

## 2020-01-05 ASSESSMENT — PAIN DESCRIPTION - ORIENTATION: ORIENTATION: RIGHT

## 2020-01-05 ASSESSMENT — PAIN DESCRIPTION - LOCATION: LOCATION: KNEE

## 2020-01-05 ASSESSMENT — PAIN DESCRIPTION - FREQUENCY: FREQUENCY: CONTINUOUS

## 2020-01-05 ASSESSMENT — PAIN DESCRIPTION - PAIN TYPE: TYPE: ACUTE PAIN

## 2020-01-05 NOTE — ED PROVIDER NOTES
[facility]     Emergency Department     Faculty Note/ Attestation      Pt Name: Vahe Rangel                                       MRN: [id number]  [place] 1990  Date of evaluation: 1/5/2020    Patients PCP:    HILDA Zheng - CNP    Attestation  I performed a history and physical examination of the patient and discussed management with the resident. I reviewed the residents note and agree with the documented findings and plan of care. Any areas of disagreement are noted on the chart. I was personally present for the key portions of any procedures. I have documented in the chart those procedures where I was not present during the key portions. I have reviewed the emergency nurses triage note. I agree with the chief complaint, past medical history, past surgical history, allergies, medications, social and family history as documented unless otherwise noted below. For Physician Assistant/ Nurse Practitioner cases/documentation I have personally evaluated this patient and have completed at least one if not all key elements of the E/M (history, physical exam, and MDM). Additional findings are as noted. Initial Screens:             Vitals:    Vitals:    01/05/20 1116   BP: 139/82   Pulse: 72   Resp: 18   Temp: 97.9 °F (36.6 °C)   TempSrc: Oral   SpO2: 98%   Weight: 200 lb (90.7 kg)   Height: 5' 7\" (1.702 m)       CHIEF COMPLAINT       Chief Complaint   Patient presents with    Knee Pain     right, no known injury. Right sided knee pain medially the pt having severe pain and radiating up into the thigh. The pt has no fevers or chills.       DIAGNOSTIC RESULTS     RADIOLOGY:   VL DUP LOWER EXTREMITY VENOUS BILATERAL    (Results Pending)       LABS:  Labs Reviewed - No data to display    EMERGENCY DEPARTMENT COURSE:     -------------------------  BP: 139/82, Temp: 97.9 °F (36.6 °C), Pulse: 72, Resp: 18  Physical Exam  Constitutional:       Appearance: She is well-developed. She is not diaphoretic. HENT:      Head: Normocephalic and atraumatic. Right Ear: External ear normal.      Left Ear: External ear normal.   Eyes:      General: No scleral icterus. Right eye: No discharge. Left eye: No discharge. Neck:      Musculoskeletal: Normal range of motion. Trachea: No tracheal deviation. Pulmonary:      Effort: Pulmonary effort is normal. No respiratory distress. Breath sounds: No stridor. Musculoskeletal: Normal range of motion. General: Tenderness (there is a bruis to the upper thigh but no infection or loss of pulses/cap refil distally) present. Skin:     General: Skin is warm and dry. Neurological:      Mental Status: She is alert and oriented to person, place, and time. Coordination: Coordination normal.   Psychiatric:         Behavior: Behavior normal.           Comments  The pain and tenderness will need a DVT ultrasound to assess for possible blood clot      Caldera DO, RDMS.   Attending Emergency Physician          Janell Do DO  01/05/20 9445

## 2020-01-05 NOTE — ED PROVIDER NOTES
[facility]  ED  Emergency Department Encounter  Emergency Medicine Resident     Pt Name: Sagar Caal  MRN: [id number]  [place] 1990  Date of evaluation: 1/5/20  PCP:  Dexter Campbell       Chief Complaint   Patient presents with    Knee Pain     right, no known injury. HISTORY OFPRESENT ILLNESS  (Location/Symptom, Timing/Onset, Context/Setting, Quality, Duration, Modifying Factors,Severity.)      Sagar Caal is a 30 y.o. female who presents with concern for a right medial to posterior knee pain, patient is tender to palpation on the right medial area, this started yesterday when patient woke up. Patient states that the pain is been getting worse. Patient is also continually pushing on her knee. Patient has no history of blood clots in her lungs, blood clots in her legs, recent travel, hemoptysis, hormone therapy. Patient denies any recent long travel or car rides. Patient denies any shortness of breath, chest pain, nausea, vomiting, fever, chills. Patient denies any trauma or falls. PAST MEDICAL / SURGICAL / SOCIAL / FAMILY HISTORY      has a past medical history of Depression, Herpes, PCOS (polycystic ovarian syndrome), PID, and STD (female). has a past surgical history that includes Tonsillectomy and adenoidectomy (1994).      Social History     Socioeconomic History    Marital status:      Spouse name: Not on file    Number of children: Not on file    Years of education: Not on file    Highest education level: Not on file   Occupational History    Not on file   Social Needs    Financial resource strain: Not on file    Food insecurity:     Worry: Not on file     Inability: Not on file    Transportation needs:     Medical: Not on file     Non-medical: Not on file   Tobacco Use    Smoking status: Current Every Day Smoker     Packs/day: 1.00     Types: Cigarettes    Smokeless tobacco: Never Used   Substance and Sexual Activity    Alcohol use: Yes     Comment: social    Drug use: No    Sexual activity: Yes     Partners: Male     Comment: Doesn't use condoms, has been on depo in the past.    Lifestyle    Physical activity:     Days per week: Not on file     Minutes per session: Not on file    Stress: Not on file   Relationships    Social connections:     Talks on phone: Not on file     Gets together: Not on file     Attends Faith service: Not on file     Active member of club or organization: Not on file     Attends meetings of clubs or organizations: Not on file     Relationship status: Not on file    Intimate partner violence:     Fear of current or ex partner: Not on file     Emotionally abused: Not on file     Physically abused: Not on file     Forced sexual activity: Not on file   Other Topics Concern    Not on file   Social History Narrative    Lives with boyfriend. Works at [employer] Finished 9th grade. Family History   Problem Relation Age of Onset    Stroke Paternal Grandmother         TIA    Diabetes Paternal Grandmother     Diabetes Maternal Grandmother     Breast Cancer Neg Hx     Colon Cancer Neg Hx     Uterine Cancer Neg Hx     Ovarian Cancer Neg Hx     Clotting Disorder Neg Hx         Allergies:  Amoxicillin and Benadryl [diphenhydramine]    Home Medications:  Prior to Admission medications    Medication Sig Start Date End Date Taking?  Authorizing Provider   ibuprofen (IBU) 600 MG tablet Take 1 tablet by mouth every 6 hours as needed for Pain 1/5/20  Yes Cristina Metcalf DO   ibuprofen (ADVIL;MOTRIN) 800 MG tablet Take 1 tablet by mouth every 8 hours as needed for Pain 4/15/19   AMINA Gannon   ondansetron (ZOFRAN ODT) 4 MG disintegrating tablet Take 1 tablet by mouth every 8 hours as needed for Nausea 4/21/18   Cruzito Renteria PA-C   ATORVASTATIN CALCIUM PO Take by mouth    Historical Provider, MD   fenofibrate 160 MG tablet Take 160 mg by mouth daily    Historical Provider, MD       REVIEW OFSYSTEMS    (2-9 systems for level 4, 10 or more for level 5)      Constitutional ROS - No recent fevers, No recent chills  Neurological ROS - No Headache, No Syncope  Opthalmologic ROS- No eye pain, No vision changes   ENT ROS - No sore throat, No congestion  Respiratory ROS - No cough, No shortness of breath  Cardiovascular ROS - No chest pain, No palpitations   Gastrointestinal ROS - No abdominal pain, No nausea, No vomiting  Genito-Urinary ROS - No dysuria, Nohematuria  Musculoskeletal ROS - No back pain, No neck pain, + knee pain   Dermatological ROS - No wound, No rash  PHYSICAL EXAM   (up to 7 for level 4, 8 or more forlevel 5)      INITIAL VITALS:   ED Triage Vitals [01/05/20 1116]   BP Temp Temp Source Pulse Resp SpO2 Height Weight   139/82 97.9 °F (36.6 °C) Oral 72 18 98 % 5' 7\" (1.702 m) 200 lb (90.7 kg)       Physical Exam  Constitutional:       Appearance: She is well-developed. HENT:      Head: Normocephalic and atraumatic. Eyes:      Pupils: Pupils are equal, round, and reactive to light. Neck:      Musculoskeletal: Normal range of motion and neck supple. Cardiovascular:      Rate and Rhythm: Normal rate and regular rhythm. Pulmonary:      Effort: Pulmonary effort is normal. No respiratory distress. Breath sounds: Normal breath sounds. No stridor. Abdominal:      General: Bowel sounds are normal. There is no distension. Palpations: Abdomen is soft. Musculoskeletal: Normal range of motion. General: No deformity. Comments: Tenderness to palpation on right medial knee, no obvious signs of deformities, no swelling or erythema, no redness, no warmth, no bulging area at site    Full range of motion of the right knee, no laxity on valgus or varus strain   Skin:     General: Skin is warm and dry. Capillary Refill: Capillary refill takes less than 2 seconds.    Neurological:      Mental Status: She is alert and oriented to person, place, and time. Psychiatric:         Behavior: Behavior normal.         DIFFERENTIAL  DIAGNOSIS     PLAN (LABS / IMAGING / EKG):  Orders Placed This Encounter   Procedures    VL DUP LOWER EXTREMITY VENOUS BILATERAL       MEDICATIONS ORDERED:  Orders Placed This Encounter   Medications    ibuprofen (IBU) 600 MG tablet     Sig: Take 1 tablet by mouth every 6 hours as needed for Pain     Dispense:  120 tablet     Refill:  0       DDX: DVT, ligamentous injury, tendon rupture, musculoskeletal pain, overuse injury, popliteal cyst, Baker's cyst    Initial MDM/Plan: 30 y.o. female who presents with concern for right knee pain, pain is specifically on the medial right knee, there is no obvious deformities, ecchymosis, trauma. Patient denies any recent eliciting factors for this pain. Patient is concerned about a DVT, as are we. She is able to ambulate without any difficulty, will plan to do a venous Doppler scan. Most likely patient has musculoskeletal pain or ligamentous injury, there is no valgus or varus laxity on exam, no difficulty with range of motion in flexion extension. Patient unlikely has any bony S or osseous deformity. Patient has no ruptured ligament. Patient heard no pop or snaps  in the past few days. DIAGNOSTIC RESULTS / EMERGENCYDEPARTMENT COURSE / MDM     LABS:  Labs Reviewed - No data to display      RADIOLOGY:  No results found. EKG  NA     All EKG's are interpreted by the Emergency Department Physicianwho either signs or Co-signs this chart in the absence of a cardiologist.    EMERGENCY DEPARTMENT COURSE:    Patient had a normal venous lower extremity Doppler ultrasound. Lower suspicion for DVT, will discharge patient home with Motrin and encourage patient to follow-up with primary care physician    PROCEDURES:  None    CONSULTS:  None    CRITICAL CARE:  Please see attending note    FINAL IMPRESSION      1. Acute pain of right knee    2.  Knee effusion, left DISPOSITION / PLAN     DISPOSITION Decision To Discharge 01/05/2020 02:25:21 PM      PATIENT REFERRED TO:  Emilie Gonsales, APRN - CNP  [address] [place] [facility]  [phone]    Call   As needed    [facility]  [address] [place] [address]  [phone]  Call   As needed      DISCHARGE MEDICATIONS:  New Prescriptions    IBUPROFEN (IBU) 600 MG TABLET    Take 1 tablet by mouth every 6 hours as needed for Pain       Clif Spicer DO  Emergency Medicine Resident    (Please note that portions of this note were completed with a voice recognition program.Efforts were made to edit the dictations but occasionally words are mis-transcribed.)        Clif Spicer DO  Resident  01/05/20 [id number]

## 2020-02-27 ENCOUNTER — HOSPITAL ENCOUNTER (EMERGENCY)
Age: 30
Discharge: HOME | End: 2020-02-27
Attending: EMERGENCY MEDICINE
Payer: COMMERCIAL

## 2020-02-27 VITALS
DIASTOLIC BLOOD PRESSURE: 84 MMHG | SYSTOLIC BLOOD PRESSURE: 134 MMHG | RESPIRATION RATE: 20 BRPM | TEMPERATURE: 97.9 F | WEIGHT: 240 LBS | BODY MASS INDEX: 42.52 KG/M2 | HEART RATE: 59 BPM | OXYGEN SATURATION: 97 % | HEIGHT: 63 IN

## 2020-02-27 PROCEDURE — 99283 EMERGENCY DEPT VISIT LOW MDM: CPT

## 2020-02-27 PROCEDURE — 6370000000 HC RX 637 (ALT 250 FOR IP): Performed by: EMERGENCY MEDICINE

## 2020-02-27 RX ORDER — BENZONATATE 100 MG/1
100 CAPSULE ORAL 3 TIMES DAILY PRN
Qty: 15 CAPSULE | Refills: 0 | Status: SHIPPED | OUTPATIENT
Start: 2020-02-27 | End: 2020-03-03

## 2020-02-27 RX ORDER — BENZONATATE 100 MG/1
100 CAPSULE ORAL ONCE
Status: COMPLETED | OUTPATIENT
Start: 2020-02-27 | End: 2020-02-27

## 2020-02-27 RX ORDER — ACETAMINOPHEN 325 MG/1
325 TABLET ORAL EVERY 6 HOURS PRN
Qty: 15 TABLET | Refills: 0 | Status: SHIPPED | OUTPATIENT
Start: 2020-02-27

## 2020-02-27 RX ORDER — ACETAMINOPHEN 325 MG/1
650 TABLET ORAL ONCE
Status: COMPLETED | OUTPATIENT
Start: 2020-02-27 | End: 2020-02-27

## 2020-02-27 RX ADMIN — ACETAMINOPHEN 650 MG: 325 TABLET ORAL at 21:34

## 2020-02-27 RX ADMIN — BENZONATATE 100 MG: 100 CAPSULE ORAL at 21:34

## 2020-02-27 ASSESSMENT — ENCOUNTER SYMPTOMS
CHOKING: 0
COLOR CHANGE: 0
WHEEZING: 0
STRIDOR: 0
FACIAL SWELLING: 0
CHEST TIGHTNESS: 0
VOMITING: 0
PHOTOPHOBIA: 0
SHORTNESS OF BREATH: 0
BLOOD IN STOOL: 0
TROUBLE SWALLOWING: 0
DIARRHEA: 0
NAUSEA: 0
ABDOMINAL PAIN: 0

## 2020-02-27 ASSESSMENT — PAIN SCALES - GENERAL: PAINLEVEL_OUTOF10: 0

## 2020-02-28 NOTE — ED PROVIDER NOTES
[facility] ED  Emergency Department Encounter  EmergencyMedicine Resident     Pt Wero Ramires  MRN: [id number]  [place] 1990  Date of evaluation: 2/27/20  PCP:  Robley Bence, APRN - CNP    CHIEF COMPLAINT       Chief Complaint   Patient presents with    URI    Fever    Headache       HISTORY OF PRESENT ILLNESS  (Location/Symptom, Timing/Onset, Context/Setting, Quality, Duration, Modifying Factors, Severity.)      Corby Zaidi is a 26 y.o. female who presents with primary complaint that she has had upper respiratory congestion since yesterday. She admits she has had a cough but no kilo chest pain. Patient admits she occasionally uses an inhaler at home for some underlying asthma but has not no significant wheezing or difficulty breathing. Patient denies any other symptoms. Afebrile on exam today. PAST MEDICAL / SURGICAL / SOCIAL / FAMILY HISTORY      has a past medical history of Depression, Herpes, PCOS (polycystic ovarian syndrome), PID, and STD (female). has a past surgical history that includes Tonsillectomy and adenoidectomy (1994).     Social History     Socioeconomic History    Marital status:      Spouse name: Not on file    Number of children: Not on file    Years of education: Not on file    Highest education level: Not on file   Occupational History    Not on file   Social Needs    Financial resource strain: Not on file    Food insecurity:     Worry: Not on file     Inability: Not on file    Transportation needs:     Medical: Not on file     Non-medical: Not on file   Tobacco Use    Smoking status: Current Every Day Smoker     Packs/day: 1.00     Types: Cigarettes    Smokeless tobacco: Never Used   Substance and Sexual Activity    Alcohol use: Yes     Comment: social    Drug use: No    Sexual activity: Yes     Partners: Male     Comment: Doesn't use condoms, has been on depo in the past.    Lifestyle    Physical activity: Days per week: Not on file     Minutes per session: Not on file    Stress: Not on file   Relationships    Social connections:     Talks on phone: Not on file     Gets together: Not on file     Attends Hinduism service: Not on file     Active member of club or organization: Not on file     Attends meetings of clubs or organizations: Not on file     Relationship status: Not on file    Intimate partner violence:     Fear of current or ex partner: Not on file     Emotionally abused: Not on file     Physically abused: Not on file     Forced sexual activity: Not on file   Other Topics Concern    Not on file   Social History Narrative    Lives with boyfriend. 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 Finished 9th grade. Family History   Problem Relation Age of Onset    Stroke Paternal Grandmother         TIA    Diabetes Paternal Grandmother     Diabetes Maternal Grandmother     Breast Cancer Neg Hx     Colon Cancer Neg Hx     Uterine Cancer Neg Hx     Ovarian Cancer Neg Hx     Clotting Disorder Neg Hx        Allergies:  Amoxicillin and Benadryl [diphenhydramine]    Home Medications:  Prior to Admission medications    Medication Sig Start Date End Date Taking?  Authorizing Provider   acetaminophen (TYLENOL) 325 MG tablet Take 1 tablet by mouth every 6 hours as needed for Pain 2/27/20  Yes David Espinal DO   benzonatate (TESSALON PERLES) 100 MG capsule Take 1 capsule by mouth 3 times daily as needed for Cough 2/27/20 3/3/20 Yes David Espinal DO   ibuprofen (IBU) 600 MG tablet Take 1 tablet by mouth every 6 hours as needed for Pain 1/5/20   Portillo Metcalf DO   ibuprofen (ADVIL;MOTRIN) 800 MG tablet Take 1 tablet by mouth every 8 hours as needed for Pain 4/15/19   AMINA Harvey   ondansetron (ZOFRAN ODT) 4 MG disintegrating tablet Take 1 tablet by mouth every 8 hours as needed for Nausea 4/21/18   Leslie Hernandez PA-C   ATORVASTATIN CALCIUM PO Take by mouth    Historical Provider, MD   fenofibrate 160 MG tablet Take 160 mg by mouth daily    Historical Provider, MD       REVIEW OF SYSTEMS    (2-9 systems for level 4, 10 or more for level 5)      Review of Systems   Constitutional: Negative for activity change, appetite change, chills, diaphoresis and fever. HENT: Positive for congestion. Negative for facial swelling, nosebleeds and trouble swallowing. Eyes: Negative for photophobia and visual disturbance. Respiratory: Negative for choking, chest tightness, shortness of breath, wheezing and stridor. Cardiovascular: Negative for chest pain and leg swelling. Gastrointestinal: Negative for abdominal pain, blood in stool, diarrhea, nausea and vomiting. Genitourinary: Negative for decreased urine volume, difficulty urinating, dysuria, enuresis and hematuria. Musculoskeletal: Negative for joint swelling and neck pain. Skin: Negative for color change, pallor and rash. Neurological: Negative for dizziness, syncope, facial asymmetry, speech difficulty, light-headedness and numbness. Psychiatric/Behavioral: Negative for agitation, behavioral problems, confusion and decreased concentration. PHYSICAL EXAM   (up to 7 for level 4, 8 or more for level 5)      INITIAL VITALS:   /84   Pulse 59   Temp 97.9 °F (36.6 °C) (Oral)   Resp 20   Ht 5' 3\" (1.6 m)   Wt 240 lb (108.9 kg)   SpO2 97%   BMI 42.51 kg/m²     Physical Exam  Constitutional:       Appearance: She is well-developed. She is not diaphoretic. HENT:      Head: Normocephalic and atraumatic. Eyes:      General:         Right eye: No discharge. Left eye: No discharge. Pupils: Pupils are equal, round, and reactive to light. Neck:      Musculoskeletal: Normal range of motion. Cardiovascular:      Rate and Rhythm: Normal rate and regular rhythm. Heart sounds: Normal heart sounds. No murmur. No friction rub. No gallop.     Pulmonary:      Effort: Pulmonary effort is normal. No influenza swab patient did not want swab at this time. Just symptomatic treatment. She denies any sore throat. I states if she developed sore throat she can use Cepacol. Gave her prescription for Tessalon as well as prescription for Tylenol and gave her dose of Tessalon as well as Tylenol while in the emergency room. Patient started to follow-up outpatient with primary care provider and return to our department if symptoms worsen. [KW]      ED Course User Index  [KW] Kami Rider DO         PROCEDURES:  None    CONSULTS:  None    CRITICAL CARE:  None    FINAL IMPRESSION      1.  Upper respiratory tract infection, unspecified type          DISPOSITION / PLAN     DISPOSITION Decision To Discharge    PATIENT REFERRED TO:  Emilie Gonsales, APRN - CNP  [address] [place] [facility]  [phone]    Schedule an appointment as soon as possible for a visit   For Follow Up      DISCHARGE MEDICATIONS:  Discharge Medication List as of 2/27/2020  9:35 PM      START taking these medications    Details   acetaminophen (TYLENOL) 325 MG tablet Take 1 tablet by mouth every 6 hours as needed for Pain, Disp-15 tablet, R-0Print      benzonatate (TESSALON PERLES) 100 MG capsule Take 1 capsule by mouth 3 times daily as needed for Cough, Disp-15 capsule, R-0Print             Kami Rider DO  Emergency Medicine Resident    (Please note that portions of this note were completed with a voicerecognition program.  Efforts were made to edit the dictations but occasionally words are mis-transcribed.)       Kami Rider DO  02/27/20 [id number]

## 2020-02-28 NOTE — ED PROVIDER NOTES
[facility]  Emergency Department  Faculty Attestation     I performed a history and physical examination of the patient and discussed management with the resident. I reviewed the residents note and agree with the documented findings and plan of care. Any areas of disagreement are noted on the chart. I was personally present for the key portions of any procedures. I have documented in the chart those procedures where I was not present during the key portions. I have reviewed the emergency nurses triage note. I agree with the chief complaint, past medical history, past surgical history, allergies, medications, social and family history as documented unless otherwise noted below. For Physician Assistant/ Nurse Practitioner cases/documentation I have personally evaluated this patient and have completed at least one if not all key elements of the E/M (history, physical exam, and MDM). Additional findings are as noted. Primary Care Physician:  HILDA Aguilera - CNP    Screenings:  [unfilled]    CHIEF COMPLAINT       Chief Complaint   Patient presents with    URI    Fever    Headache       RECENT VITALS:   Temp: 97.9 °F (36.6 °C),  Pulse: 59, Resp: 20, BP: 134/84    LABS:  Labs Reviewed - No data to display    Radiology  No orders to display         Attending Physician Additional  Notes    Several days of URI symptoms including myalgias chills cough congestion nausea. No history of asthma or suspicion for pregnancy. On exam she is uncomfortable, appears chilled, vital signs are normal except for mild bradycardia. Lungs are clear. Mucous membranes moist without lesion. Neck supple without lymphadenopathy. Conjunctiva clear. Abdomen is soft and nontender. Impression is viral syndrome. Plan is symptomatic therapy. Nedra Davis.  Isaura Dalal MD, [facility]  Attending Emergency  Physician                Nay Polanco MD  02/28/20 [id number]

## 2021-08-11 ENCOUNTER — HOSPITAL ENCOUNTER (OUTPATIENT)
Age: 31
Setting detail: OBSERVATION
Discharge: HOME | End: 2021-08-11
Attending: EMERGENCY MEDICINE | Admitting: EMERGENCY MEDICINE
Payer: COMMERCIAL

## 2021-08-11 ENCOUNTER — APPOINTMENT (OUTPATIENT)
Dept: GENERAL RADIOLOGY | Age: 31
End: 2021-08-11
Payer: COMMERCIAL

## 2021-08-11 VITALS
BODY MASS INDEX: 44.3 KG/M2 | SYSTOLIC BLOOD PRESSURE: 96 MMHG | RESPIRATION RATE: 18 BRPM | OXYGEN SATURATION: 96 % | HEART RATE: 60 BPM | DIASTOLIC BLOOD PRESSURE: 64 MMHG | TEMPERATURE: 97.2 F | WEIGHT: 250 LBS | HEIGHT: 63 IN

## 2021-08-11 DIAGNOSIS — I25.9 CHEST PAIN DUE TO MYOCARDIAL ISCHEMIA, UNSPECIFIED ISCHEMIC CHEST PAIN TYPE: ICD-10-CM

## 2021-08-11 DIAGNOSIS — R07.89 OTHER CHEST PAIN: Primary | ICD-10-CM

## 2021-08-11 PROBLEM — R07.9 CHEST PAIN: Status: ACTIVE | Noted: 2021-08-11

## 2021-08-11 LAB
ABSOLUTE EOS #: 0.11 K/UL (ref 0–0.44)
ABSOLUTE IMMATURE GRANULOCYTE: 0.04 K/UL (ref 0–0.3)
ABSOLUTE LYMPH #: 2.3 K/UL (ref 1.1–3.7)
ABSOLUTE MONO #: 0.75 K/UL (ref 0.1–1.2)
ANION GAP SERPL CALCULATED.3IONS-SCNC: 12 MMOL/L (ref 9–17)
BASOPHILS # BLD: 0 % (ref 0–2)
BASOPHILS ABSOLUTE: 0.03 K/UL (ref 0–0.2)
BUN BLDV-MCNC: 15 MG/DL (ref 6–20)
BUN/CREAT BLD: ABNORMAL (ref 9–20)
CALCIUM SERPL-MCNC: 9.1 MG/DL (ref 8.6–10.4)
CHLORIDE BLD-SCNC: 105 MMOL/L (ref 98–107)
CO2: 23 MMOL/L (ref 20–31)
CREAT SERPL-MCNC: 0.76 MG/DL (ref 0.5–0.9)
DIFFERENTIAL TYPE: ABNORMAL
EKG ATRIAL RATE: 82 BPM
EKG P AXIS: 52 DEGREES
EKG P-R INTERVAL: 152 MS
EKG Q-T INTERVAL: 382 MS
EKG QRS DURATION: 78 MS
EKG QTC CALCULATION (BAZETT): 446 MS
EKG R AXIS: 51 DEGREES
EKG T AXIS: 42 DEGREES
EKG VENTRICULAR RATE: 82 BPM
EOSINOPHILS RELATIVE PERCENT: 1 % (ref 1–4)
GFR AFRICAN AMERICAN: >60 ML/MIN
GFR NON-AFRICAN AMERICAN: >60 ML/MIN
GFR SERPL CREATININE-BSD FRML MDRD: ABNORMAL ML/MIN/{1.73_M2}
GFR SERPL CREATININE-BSD FRML MDRD: ABNORMAL ML/MIN/{1.73_M2}
GLUCOSE BLD-MCNC: 132 MG/DL (ref 70–99)
HCG QUALITATIVE: NEGATIVE
HCT VFR BLD CALC: 40.9 % (ref 36.3–47.1)
HEMOGLOBIN: 13.3 G/DL (ref 11.9–15.1)
IMMATURE GRANULOCYTES: 1 %
LYMPHOCYTES # BLD: 30 % (ref 24–43)
MCH RBC QN AUTO: 29.7 PG (ref 25.2–33.5)
MCHC RBC AUTO-ENTMCNC: 32.5 G/DL (ref 28.4–34.8)
MCV RBC AUTO: 91.3 FL (ref 82.6–102.9)
MONOCYTES # BLD: 10 % (ref 3–12)
NRBC AUTOMATED: 0 PER 100 WBC
PDW BLD-RTO: 12.1 % (ref 11.8–14.4)
PLATELET # BLD: 286 K/UL (ref 138–453)
PLATELET ESTIMATE: ABNORMAL
PMV BLD AUTO: 10.7 FL (ref 8.1–13.5)
POTASSIUM SERPL-SCNC: 3.7 MMOL/L (ref 3.7–5.3)
RBC # BLD: 4.48 M/UL (ref 3.95–5.11)
RBC # BLD: ABNORMAL 10*6/UL
SEG NEUTROPHILS: 58 % (ref 36–65)
SEGMENTED NEUTROPHILS ABSOLUTE COUNT: 4.49 K/UL (ref 1.5–8.1)
SODIUM BLD-SCNC: 140 MMOL/L (ref 135–144)
TROPONIN INTERP: NORMAL
TROPONIN INTERP: NORMAL
TROPONIN T: NORMAL NG/ML
TROPONIN T: NORMAL NG/ML
TROPONIN, HIGH SENSITIVITY: <6 NG/L (ref 0–14)
TROPONIN, HIGH SENSITIVITY: <6 NG/L (ref 0–14)
WBC # BLD: 7.7 K/UL (ref 3.5–11.3)
WBC # BLD: ABNORMAL 10*3/UL

## 2021-08-11 PROCEDURE — 85025 COMPLETE CBC W/AUTO DIFF WBC: CPT

## 2021-08-11 PROCEDURE — 6360000002 HC RX W HCPCS: Performed by: HEALTH CARE PROVIDER

## 2021-08-11 PROCEDURE — G0378 HOSPITAL OBSERVATION PER HR: HCPCS

## 2021-08-11 PROCEDURE — 84484 ASSAY OF TROPONIN QUANT: CPT

## 2021-08-11 PROCEDURE — 71046 X-RAY EXAM CHEST 2 VIEWS: CPT

## 2021-08-11 PROCEDURE — 6370000000 HC RX 637 (ALT 250 FOR IP): Performed by: HEALTH CARE PROVIDER

## 2021-08-11 PROCEDURE — 93005 ELECTROCARDIOGRAM TRACING: CPT | Performed by: HEALTH CARE PROVIDER

## 2021-08-11 PROCEDURE — 93005 ELECTROCARDIOGRAM TRACING: CPT | Performed by: EMERGENCY MEDICINE

## 2021-08-11 PROCEDURE — 96372 THER/PROPH/DIAG INJ SC/IM: CPT

## 2021-08-11 PROCEDURE — 80048 BASIC METABOLIC PNL TOTAL CA: CPT

## 2021-08-11 PROCEDURE — 84703 CHORIONIC GONADOTROPIN ASSAY: CPT

## 2021-08-11 PROCEDURE — 2580000003 HC RX 258: Performed by: HEALTH CARE PROVIDER

## 2021-08-11 PROCEDURE — 99285 EMERGENCY DEPT VISIT HI MDM: CPT

## 2021-08-11 RX ORDER — ONDANSETRON 2 MG/ML
4 INJECTION INTRAMUSCULAR; INTRAVENOUS EVERY 6 HOURS PRN
Status: DISCONTINUED | OUTPATIENT
Start: 2021-08-11 | End: 2021-08-11 | Stop reason: HOSPADM

## 2021-08-11 RX ORDER — ONDANSETRON 4 MG/1
4 TABLET, ORALLY DISINTEGRATING ORAL EVERY 8 HOURS PRN
Status: DISCONTINUED | OUTPATIENT
Start: 2021-08-11 | End: 2021-08-11 | Stop reason: HOSPADM

## 2021-08-11 RX ORDER — ASPIRIN 81 MG/1
324 TABLET, CHEWABLE ORAL ONCE
Status: COMPLETED | OUTPATIENT
Start: 2021-08-11 | End: 2021-08-11

## 2021-08-11 RX ORDER — ATORVASTATIN CALCIUM 20 MG/1
20 TABLET, FILM COATED ORAL DAILY
Status: DISCONTINUED | OUTPATIENT
Start: 2021-08-11 | End: 2021-08-11 | Stop reason: HOSPADM

## 2021-08-11 RX ORDER — NITROGLYCERIN 0.4 MG/1
0.4 TABLET SUBLINGUAL EVERY 5 MIN PRN
Status: COMPLETED | OUTPATIENT
Start: 2021-08-11 | End: 2021-08-11

## 2021-08-11 RX ORDER — FENOFIBRATE 160 MG/1
160 TABLET ORAL DAILY
Status: DISCONTINUED | OUTPATIENT
Start: 2021-08-11 | End: 2021-08-11 | Stop reason: HOSPADM

## 2021-08-11 RX ORDER — SODIUM CHLORIDE 0.9 % (FLUSH) 0.9 %
5-40 SYRINGE (ML) INJECTION EVERY 12 HOURS SCHEDULED
Status: DISCONTINUED | OUTPATIENT
Start: 2021-08-11 | End: 2021-08-11 | Stop reason: HOSPADM

## 2021-08-11 RX ORDER — SODIUM CHLORIDE 0.9 % (FLUSH) 0.9 %
5-40 SYRINGE (ML) INJECTION PRN
Status: DISCONTINUED | OUTPATIENT
Start: 2021-08-11 | End: 2021-08-11 | Stop reason: HOSPADM

## 2021-08-11 RX ORDER — ACETAMINOPHEN 325 MG/1
650 TABLET ORAL EVERY 4 HOURS PRN
Status: DISCONTINUED | OUTPATIENT
Start: 2021-08-11 | End: 2021-08-11 | Stop reason: HOSPADM

## 2021-08-11 RX ADMIN — ENOXAPARIN SODIUM 40 MG: 100 INJECTION SUBCUTANEOUS at 10:41

## 2021-08-11 RX ADMIN — ASPIRIN 324 MG: 81 TABLET, CHEWABLE ORAL at 02:34

## 2021-08-11 RX ADMIN — SODIUM CHLORIDE, PRESERVATIVE FREE 10 ML: 5 INJECTION INTRAVENOUS at 10:39

## 2021-08-11 RX ADMIN — NITROGLYCERIN 0.4 MG: 0.4 TABLET, ORALLY DISINTEGRATING SUBLINGUAL at 02:34

## 2021-08-11 ASSESSMENT — ENCOUNTER SYMPTOMS
WHEEZING: 0
VOMITING: 0
CHEST TIGHTNESS: 1
ABDOMINAL PAIN: 0
NAUSEA: 0
COUGH: 1
BACK PAIN: 0
SHORTNESS OF BREATH: 1

## 2021-08-11 ASSESSMENT — HEART SCORE
ECG: 0
ECG: 0

## 2021-08-11 ASSESSMENT — PAIN DESCRIPTION - DESCRIPTORS: DESCRIPTORS: ACHING

## 2021-08-11 ASSESSMENT — PAIN DESCRIPTION - PROGRESSION: CLINICAL_PROGRESSION: NOT CHANGED

## 2021-08-11 ASSESSMENT — PAIN SCALES - GENERAL
PAINLEVEL_OUTOF10: 0
PAINLEVEL_OUTOF10: 8

## 2021-08-11 ASSESSMENT — PAIN DESCRIPTION - ORIENTATION: ORIENTATION: LEFT;MID

## 2021-08-11 ASSESSMENT — PAIN DESCRIPTION - PAIN TYPE: TYPE: ACUTE PAIN

## 2021-08-11 ASSESSMENT — PAIN DESCRIPTION - FREQUENCY: FREQUENCY: CONTINUOUS

## 2021-08-11 ASSESSMENT — PAIN DESCRIPTION - ONSET: ONSET: ON-GOING

## 2021-08-11 ASSESSMENT — PAIN DESCRIPTION - LOCATION: LOCATION: CHEST

## 2021-08-11 NOTE — LETTER
[facility]  Med Surg  [id number] [address]  Phone: [phone]    No name on file. August 11, 2021     Patient: Sanjeev Zurita   YOB: 1990   Date of Visit: 8/11/2021       To Whom It May Concern: It is my medical opinion that Judy Gallagher may return to full duty immediately with no restrictions. Patient was admitted from 08/10/2021 - 08/11/2021  If you have any questions or concerns, please don't hesitate to call. Sincerely,        No name on file.

## 2021-08-11 NOTE — ED PROVIDER NOTES
[facility] ED  Emergency Department Encounter  EmergencyMedicine Resident     Pt Tawny Ford  MRN: [id number]  [place] 1990  Date of evaluation: 8/11/21  PCP:  HILDA Bailey CNP    This patient was evaluated in the Emergency Department for symptoms described in the history of present illness. The patient was evaluated in the context of the global COVID-19 pandemic, which necessitated consideration that the patient might be at risk for infection with the SARS-CoV-2 virus that causes COVID-19. Institutional protocols and algorithms that pertain to the evaluation of patients at risk for COVID-19 are in a state of rapid change based on information released by regulatory bodies including the CDC and federal and state organizations. These policies and algorithms were followed during the patient's care in the ED. CHIEF COMPLAINT       Chief Complaint   Patient presents with    Chest Pain       HISTORY OF PRESENT ILLNESS  (Location/Symptom, Timing/Onset, Context/Setting, Quality, Duration, Modifying Factors, Severity.)      Pato Hurst is a 33 y.o. female smoker with hyperlipidemia who presents with acute onset left sided chest pain that radiates to her left shoulder and jaw. Describes it as sharp and tight, non-pleuritic. Acute onset in the shower, thought was a muscle spasm but unable to stretch it out, not reproducible with palpation. Improved now compared to onset earlier but still constant. No recent travel, surgery, birth control. Intermittently feels short of breath or heart racing that she attributes to anxiety, not sustained. Has had a cough but unchanged from baseline (smoker cough). No fevers/chills/congestion. Current on COVID vaccine. PAST MEDICAL / SURGICAL / SOCIAL / FAMILY HISTORY      has a past medical history of Depression, Herpes, PCOS (polycystic ovarian syndrome), PID, and STD (female).      has a past surgical history that includes Tonsillectomy and adenoidectomy (1994). Social History     Socioeconomic History    Marital status:      Spouse name: Not on file    Number of children: Not on file    Years of education: Not on file    Highest education level: Not on file   Occupational History    Not on file   Tobacco Use    Smoking status: Current Every Day Smoker     Packs/day: 1.00     Types: Cigarettes    Smokeless tobacco: Never Used   Substance and Sexual Activity    Alcohol use: Yes     Comment: social    Drug use: No    Sexual activity: Yes     Partners: Male     Comment: Doesn't use condoms, has been on depo in the past.    Other Topics Concern    Not on file   Social History Narrative    Lives with boyfriend. 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 Social Determinants of Health     Financial Resource Strain:     Difficulty of Paying Living Expenses:    Food Insecurity:     Worried About Running Out of Food in the Last Year:     [address] in the Last Year:    Transportation Needs:     Lack of Transportation (Medical):      Lack of Transportation (Non-Medical):    Physical Activity:     Days of Exercise per Week:     Minutes of Exercise per Session:    Stress:     Feeling of Stress :    Social Connections:     Frequency of Communication with Friends and Family:     Frequency of Social Gatherings with Friends and Family:     Attends Rastafari Services:     Active Member of Clubs or Organizations:     Attends Club or Organization Meetings:     Marital Status:    Intimate Partner Violence:     Fear of Current or Ex-Partner:     Emotionally Abused:     Physically Abused:     Sexually Abused:        Family History   Problem Relation Age of Onset    Stroke Paternal Grandmother         TIA    Diabetes Paternal Grandmother     Diabetes Maternal Grandmother     Breast Cancer Neg Hx     Colon Cancer Neg Hx     Uterine Cancer Neg Hx     Ovarian Cancer Neg Hx     Clotting Disorder Neg Hx        Allergies:  Amoxicillin and Benadryl [diphenhydramine]    Home Medications:  Prior to Admission medications    Medication Sig Start Date End Date Taking? Authorizing Provider   ATORVASTATIN CALCIUM PO Take by mouth   Yes Historical Provider, MD   fenofibrate 160 MG tablet Take 160 mg by mouth daily   Yes Historical Provider, MD   acetaminophen (TYLENOL) 325 MG tablet Take 1 tablet by mouth every 6 hours as needed for Pain 2/27/20   Adriana Starr, DO   ibuprofen (IBU) 600 MG tablet Take 1 tablet by mouth every 6 hours as needed for Pain 1/5/20   Eloisa Metcalf, DO   ibuprofen (ADVIL;MOTRIN) 800 MG tablet Take 1 tablet by mouth every 8 hours as needed for Pain 4/15/19   AMINA Grady   ondansetron (ZOFRAN ODT) 4 MG disintegrating tablet Take 1 tablet by mouth every 8 hours as needed for Nausea 4/21/18   Jarohco Llanes PA-C       REVIEW OF SYSTEMS    (2-9 systems for level 4, 10 or more for level 5)      Review of Systems   Constitutional: Negative for activity change, chills, diaphoresis, fatigue and fever. Respiratory: Positive for cough, chest tightness and shortness of breath. Negative for wheezing. Cardiovascular: Positive for chest pain and palpitations. Negative for leg swelling. Gastrointestinal: Negative for abdominal pain, nausea and vomiting. Genitourinary: Negative for dysuria. Musculoskeletal: Negative for back pain. Neurological: Negative for dizziness, syncope and light-headedness. PHYSICAL EXAM   (up to 7 for level 4, 8 or more for level 5)      INITIAL VITALS:   BP (!) 129/91   Pulse 95   Temp 97.9 °F (36.6 °C) (Infrared)   Resp 16   Ht 5' 3\" (1.6 m)   Wt 250 lb (113.4 kg)   SpO2 97%   BMI 44.29 kg/m²     Physical Exam  Constitutional:       General: She is not in acute distress. Appearance: Normal appearance. She is obese. HENT:      Head: Normocephalic and atraumatic.    Eyes:      Extraocular Movements: Reflex to MG    HCG Qualitative, Serum    Troponin    Inpatient consult to Cardiology    PATIENT STATUS (FROM ED OR OR/PROCEDURAL) Observation       MEDICATIONS ORDERED:  Orders Placed This Encounter   Medications    aspirin chewable tablet 324 mg    nitroGLYCERIN (NITROSTAT) SL tablet 0.4 mg       DIAGNOSTIC RESULTS / PROCEDURES / CONSULTS   LAB RESULTS:  Results for orders placed or performed during the hospital encounter of 08/11/21   CBC Auto Differential   Result Value Ref Range    WBC 7.7 3.5 - 11.3 k/uL    RBC 4.48 3.95 - 5.11 m/uL    Hemoglobin 13.3 11.9 - 15.1 g/dL    Hematocrit 40.9 36.3 - 47.1 %    MCV 91.3 82.6 - 102.9 fL    MCH 29.7 25.2 - 33.5 pg    MCHC 32.5 28.4 - 34.8 g/dL    RDW 12.1 11.8 - 14.4 %    Platelets 960 570 - 909 k/uL    MPV 10.7 8.1 - 13.5 fL    NRBC Automated 0.0 0.0 per 100 WBC    Differential Type NOT REPORTED     Seg Neutrophils 58 36 - 65 %    Lymphocytes 30 24 - 43 %    Monocytes 10 3 - 12 %    Eosinophils % 1 1 - 4 %    Basophils 0 0 - 2 %    Immature Granulocytes 1 (H) 0 %    Segs Absolute 4.49 1.50 - 8.10 k/uL    Absolute Lymph # 2.30 1.10 - 3.70 k/uL    Absolute Mono # 0.75 0.10 - 1.20 k/uL    Absolute Eos # 0.11 0.00 - 0.44 k/uL    Basophils Absolute 0.03 0.00 - 0.20 k/uL    Absolute Immature Granulocyte 0.04 0.00 - 0.30 k/uL    WBC Morphology NOT REPORTED     RBC Morphology NOT REPORTED     Platelet Estimate NOT REPORTED    Basic Metabolic Panel w/ Reflex to MG   Result Value Ref Range    Glucose 132 (H) 70 - 99 mg/dL    BUN 15 6 - 20 mg/dL    CREATININE 0.76 0.50 - 0.90 mg/dL    Bun/Cre Ratio NOT REPORTED 9 - 20    Calcium 9.1 8.6 - 10.4 mg/dL    Sodium 140 135 - 144 mmol/L    Potassium 3.7 3.7 - 5.3 mmol/L    Chloride 105 98 - 107 mmol/L    CO2 23 20 - 31 mmol/L    Anion Gap 12 9 - 17 mmol/L    GFR Non-African American >60 >60 mL/min    GFR African American >60 >60 mL/min    GFR Comment          GFR Staging NOT REPORTED    HCG Qualitative, Serum   Result Value Ref Range    hCG Qual NEGATIVE NEGATIVE   Troponin   Result Value Ref Range    Troponin, High Sensitivity <6 0 - 14 ng/L    Troponin T NOT REPORTED <0.03 ng/mL    Troponin Interp NOT REPORTED    Troponin   Result Value Ref Range    Troponin, High Sensitivity <6 0 - 14 ng/L    Troponin T NOT REPORTED <0.03 ng/mL    Troponin Interp NOT REPORTED        RADIOLOGY:  XR CHEST (2 VW)    Result Date: 8/11/2021  EXAMINATION: TWO XRAY VIEWS OF THE CHEST 8/11/2021 2:01 am COMPARISON: Chest two views June 27, 2015. HISTORY: ORDERING SYSTEM PROVIDED HISTORY: upper left chest pain TECHNOLOGIST PROVIDED HISTORY: upper left chest pain FINDINGS: The heart is normal in size and configuration. The mediastinal contours are within normal limits. The lungs are well aerated. The pleural surfaces are normal and no evidence of a pleural effusion is seen. Bones and soft tissues are unremarkable. Unremarkable radiographic views of the chest.     EKG:  EKG: normal EKG, normal sinus rhythm. All EKG's are interpreted by the Emergency Department Physician who either signs or Co-signs this chart in the absence of a cardiologist.    PROCEDURES:  Procedures     CONSULTS:  IP CONSULT TO CARDIOLOGY    FINAL IMPRESSION      1. Other chest pain          DISPOSITION / PLAN     DISPOSITION    Persistent chest pain with multiple risk factors and fam hx sig for MI in young age    [de-identified] to Obs w/Cards consult    PATIENT REFERRED TO:  No follow-up provider specified.     DISCHARGE MEDICATIONS:  New Prescriptions    No medications on file       Timmy Alva MD  Emergency Medicine Resident    (Please note that portions of thisnote were completed with a voice recognition program.  Efforts were made to edit the dictations but occasionally words are mis-transcribed.)       Dakota Simmons MD  Resident  08/11/21 8517

## 2021-08-11 NOTE — CONSULTS
[facility]    Consult / H&P               Today's Date: 8/11/2021  Patient Name: Mariaelena Bond  Date of admission: 8/11/2021  1:32 AM  Patient's age: 33 y.o., 1990  Admission Dx: Other chest pain [R07.89]  Chest pain [R07.9]    Reason for Consult:  Cardiac evaluation    Requesting Physician: Robin Lopez MD    CHIEF COMPLAINT: Atypical Chest Pain. History Obtained From:  patient    HISTORY OF PRESENT ILLNESS:      The patient is a 33 y.o.  female, an active smoker ( 22 Pack Years), having a past medical history of dyslipidemia ( on fenofibrate and atorvastatin) admitted with chest pain. It started suddenly while patient was taking shower and lasted for a few seconds. It radiates to the left shoulder. No relieving and aggravating factors noted by the patient. Patient denies palpitation, sweating, skip beat and difficulty breathing. She is having a significant PMHx of CHD in her family, Mother had PCI in her 52's. Consult from cardiology is taken to address the chest pain. On assessment she is obese having a BMI of 44. 29  kg/m²  /87   Pulse 94   Temp 97.2 °F (36.2 °C) (Oral)   SpO2 97%     Past Medical History:   has a past medical history of Depression, Herpes, PCOS (polycystic ovarian syndrome), PID, and STD (female). Past Surgical History:   has a past surgical history that includes Tonsillectomy and adenoidectomy (1994). Home Medications:    Prior to Admission medications    Medication Sig Start Date End Date Taking?  Authorizing Provider   acetaminophen (TYLENOL) 325 MG tablet Take 1 tablet by mouth every 6 hours as needed for Pain 2/27/20  Yes Miltonzhanna Gaspar,    ibuprofen (ADVIL;MOTRIN) 800 MG tablet Take 1 tablet by mouth every 8 hours as needed for Pain 4/15/19  Yes AMINA Mcdonough   ATORVASTATIN CALCIUM PO Take by mouth   Yes Historical Provider, MD   fenofibrate 160 MG tablet Take 160 mg by mouth daily   Yes Historical Provider, MD ibuprofen (IBU) 600 MG tablet Take 1 tablet by mouth every 6 hours as needed for Pain 1/5/20   Terri Metcalf,      Allergies:  Amoxicillin and Benadryl [diphenhydramine]    Social History:   reports that she has been smoking cigarettes. She has been smoking about 1.00 pack per day. She has never used smokeless tobacco. She reports current alcohol use. She reports that she does not use drugs. Family History: family history includes Diabetes in her maternal grandmother and paternal grandmother; Stroke in her paternal grandmother. Positive family history of Premature CAD in mother. REVIEW OF SYSTEMS:    Constitutional: there has been no unanticipated weight loss. There's been No change in energy level, No change in activity level. Eyes: No visual changes or diplopia. No scleral icterus. ENT: No Headaches  Cardiovascular: Compalins of sharp chest pain, now resolved. Denies palpitation, sweating and SOB. Respiratory: No previous pulmonary problems, No cough  Gastrointestinal: No abdominal pain. No change in bowel or bladder habits. Genitourinary: No dysuria, trouble voiding, or hematuria. Musculoskeletal:  No gait disturbance, No weakness or joint complaints. Integumentary: No rash or pruritis. Neurological: No headache, diplopia, change in muscle strength, numbness or tingling. No change in gait, balance, coordination, mood, affect, memory, mentation, behavior. Psychiatric: No anxiety, or depression. Endocrine: No temperature intolerance. No excessive thirst, fluid intake, or urination. No tremor. Hematologic/Lymphatic: No abnormal bruising or bleeding, blood clots or swollen lymph nodes. Allergic/Immunologic: No nasal congestion or hives.       PHYSICAL EXAM:      /87   Pulse 94   Temp 97.2 °F (36.2 °C) (Oral)   Resp 14   Ht 5' 3\" (1.6 m)   Wt 250 lb (113.4 kg)   SpO2 97%   BMI 44.29 kg/m²    Constitutional and General Appearance: alert, cooperative, no distress and appears stated age  [de-identified]: PERRL, no cervical lymphadenopathy. No masses palpable. Normal oral mucosa  Respiratory:  Normal excursion and expansion without use of accessory muscles  Resp Auscultation: Good respiratory effort. No for increased work of breathing. On auscultation: clear to auscultation bilaterally  Cardiovascular: The apical impulse is not displaced  Heart tones are crisp and normal. regular S1 and S2.  Jugular venous pulsation Normal  The carotid upstroke is normal in amplitude and contour without delay or bruit  Peripheral pulses are symmetrical and full   Abdomen:   No masses or tenderness  Bowel sounds present  Extremities:   No Cyanosis or Clubbing   Lower extremity edema: No   Skin: Warm and dry  Neurological:  Alert and oriented. Moves all extremities well  No abnormalities of mood, affect, memory, mentation, or behavior are noted    DATA:    Diagnostics:      EKG:   normal EKG, normal sinus rhythm, unchanged from previous tracings. ECHO:   not obtained. Labs:     CBC:   Recent Labs     08/11/21  0238   WBC 7.7   HGB 13.3   HCT 40.9        BMP:   Recent Labs     08/11/21  0238      K 3.7   CO2 23   BUN 15   CREATININE 0.76   LABGLOM >60   GLUCOSE 132*     BNP: No results for input(s): BNP in the last 72 hours. PT/INR: No results for input(s): PROTIME, INR in the last 72 hours. APTT:No results for input(s): APTT in the last 72 hours. CARDIAC ENZYMES:No results for input(s): CKTOTAL, CKMB, CKMBINDEX, TROPONINI in the last 72 hours. FASTING LIPID PANEL:No results found for: HDL, LDLDIRECT, LDLCALC, TRIG  LIVER PROFILE:No results for input(s): AST, ALT, LABALBU in the last 72 hours. IMPRESSION:    1. Atypical Chest Pain. 2. Dyslipidemia,    RECOMMENDATIONS:  1. Chest pain is atypical and ischemia does not seems to be the cause. EKG is NSR with no ST-T wave changes. Trop 1* 2 are negatives. Though risk factors for CAD are present, Obesity, Smoking and Dyslipidemia. Stress test will be done on an outpatient basis. 2. Continue home medicines Fenofibrate and Atorvastatin. Will discuss with rounding attending Dr. Peng Driscoll, for final recommendations. Adeola Lopez MD  Cardiology Service  Internal Medicine Residency Program, PGY-1  [facility]        Attending Physician Statement:    I have discussed the care of  Sanjeev Zurita , including pertinent history and exam findings, with the Cardiology fellow/resident. I have seen and examined the patient and the key elements of all parts of the encounter have been performed by me. I agree with the assessment, plan and orders as documented by the fellow/resident, after I modified exam findings and plan of treatments, and the final version is my approved version of the assessment. Additional Comments:   Chest pain, atypical, initially pressure, now sharp in nature and constant  Normal ECG  Negative Troponins  Does have risk factors  - after long discussion with patient about options, she wants to proceed with out patient stress testing.   - follow up in 2 weeks. Luis Miller DO, [facility], [address], [address], [place] [address] [facility]  [email or web address]  [phone]

## 2021-08-11 NOTE — ED PROVIDER NOTES
[facility] [facility]     Emergency Department     Faculty Attestation        I performed a history and physical examination of the patient and discussed management with the resident. I reviewed the residents note and agree with the documented findings and plan of care. Any areas of disagreement are noted on the chart. I was personally present for the key portions of any procedures. I have documented in the chart those procedures where I was not present during the key portions. I have reviewed the emergency nurses triage note. I agree with the chief complaint, past medical history, past surgical history, allergies, medications, social and family history as documented unless otherwise noted below. For mid-level providers such as nurse practitioners as well as physicians assistants:    I have personally seen and evaluated the patient. I find the patient's history and physical exam are consistent with NP/PA documentation. I agree with the care provided, treatment rendered, disposition, & follow-up plan. Additional findings are as noted. Vital Signs: BP (!) 129/91   Pulse 95   Temp 97.9 °F (36.6 °C) (Infrared)   Resp 16   Ht 5' 3\" (1.6 m)   Wt 250 lb (113.4 kg)   SpO2 97%   BMI 44.29 kg/m²   PCP:  BUD LR, APRN - CNP    Pertinent Comments:     Patient presents with left-sided chest tightness this happened while she was in the shower. She states it is slightly worse with exertion. There is no shortness of breath cough fevers chills or pleuritic nature of chest pain. Not tearing or ripping. Does have a history of high cholesterol and does smoke but no family history of early cardiac disease.   Will check EKG, CBC, BMP, troponin, chest x-ray, aspirin, nitro, possible admission      Critical Care  None          Mitzy Miles MD    Attending Emergency Medicine Physician              Pretty Powell MD  08/11/21 [id number]

## 2021-08-11 NOTE — PROGRESS NOTES
[address]  CDU / OBSERVATION ENCOUNTER  ATTENDING NOTE       I performed a history and physical examination of the patient and discussed management with the resident or midlevel provider. I reviewed the resident or midlevel provider's note and agree with the documented findings and plan of care. Any areas of disagreement are noted on the chart. I was personally present for the key portions of any procedures. I have documented in the chart those procedures where I was not present during the key portions. I have reviewed the nurses notes. I agree with the chief complaint, past medical history, past surgical history, allergies, medications, social and family history as documented unless otherwise noted below. The Family history, social history, and ROS are effectively unchanged since admission unless noted elsewhere in the chart. Pain atypical for this patient. Patient describes constant chest discomfort with a exertional component but also a positional component. Patient's symptoms are not fully consistent with angina but with remote risk factors patient was admitted for cardiology to see and reevaluate. Patient was determined to be appropriate for outpatient stress testing.   Patient subsequent discharged in good condition    Bhavana Herrera MD  Attending Emergency  Physician

## 2021-08-11 NOTE — H&P
[address] [facility]  CDU / OBSERVATION eNCOUnter  Resident Note     Pt Name: Dakota Meyer  MRN: [id number]  [place] 1990  Date of evaluation: 8/11/21  Patient's PCP is :  HILDA Diaz - CNP    CHIEF COMPLAINT       Chief Complaint   Patient presents with    Chest Pain         HISTORY OF PRESENT ILLNESS    Dakota Meyer is a 33 y.o. female past medical history of smoking and hyperlipidemia but family history significant for MI in young age who presents acute chest pain starting last night while patient was showering. Pain is in the left side of the chest and radiates to the left shoulder and jaw. She reports that it felt like a sharp pressure. It did not get worse with breathing, movement, palpation. Patient rated pain at 5 out of 10. Location/Symptom: left chest pain  Timing/Onset: Last night  Provocation: Exertion  Quality: Sharp pressure  Radiation: Left arm and jaw  Severity: 5 out of 10  Timing/Duration: Intermittent  Modifying Factors: None reported    REVIEW OF SYSTEMS     Bold positive  General ROS - No fevers, No malaise   Ophthalmic ROS - No discharge, No changes in vision  ENT ROS -  No sore throat, No rhinorrhea,   Respiratory ROS - no shortness of breath, no cough, no  wheezing  Cardiovascular ROS - No chest pain, no dyspnea on exertion  Gastrointestinal ROS - No abdominal pain, no nausea or vomiting, no change in bowel habits, no black or bloody stools  Genito-Urinary ROS - No dysuria, trouble voiding, or hematuria  Musculoskeletal ROS - No myalgias, No arthalgias  Neurological ROS - No headache, no dizziness/lightheadedness, No focal weakness, no loss of sensation  Dermatological ROS - No lesions, No rash     (PQRS) Advance directives on face sheet per hospital policy. No change unless specifically mentioned in chart    PAST MEDICAL HISTORY    has a past medical history of Depression, Herpes, PCOS (polycystic ovarian syndrome), PID, and STD (female). Hyperlipidemia and smoking. I have reviewed the past medical history with the patient and it is pertinent to this complaint. SURGICAL HISTORY      has a past surgical history that includes Tonsillectomy and adenoidectomy (1994). I have reviewed and agree with Surgical History entered and it is not pertinent to this complaint. CURRENT MEDICATIONS     sodium chloride flush 0.9 % injection 5-40 mL, 2 times per day  sodium chloride flush 0.9 % injection 5-40 mL, PRN  enoxaparin (LOVENOX) injection 40 mg, Daily  acetaminophen (TYLENOL) tablet 650 mg, Q4H PRN  ondansetron (ZOFRAN-ODT) disintegrating tablet 4 mg, Q8H PRN   Or  ondansetron (ZOFRAN) injection 4 mg, Q6H PRN  fenofibrate (TRIGLIDE) tablet 160 mg, Daily  atorvastatin (LIPITOR) tablet 20 mg, Daily        All medication charted and reviewed. ALLERGIES     is allergic to amoxicillin and benadryl [diphenhydramine]. FAMILY HISTORY     She indicated that the status of her maternal grandmother is unknown. She indicated that the status of her paternal grandmother is unknown. She indicated that the status of her neg hx is unknown.     family history includes Diabetes in her maternal grandmother and paternal grandmother; Stroke in her paternal grandmother. The patient denies any pertinent family history. I have reviewed and agree with the family history entered. I have reviewed the Family History and it is not significant to the case    SOCIAL HISTORY      reports that she has been smoking cigarettes. She has been smoking about 1.00 pack per day. She has never used smokeless tobacco. She reports current alcohol use. She reports that she does not use drugs. I have reviewed and agree with all Social.  There are concerns for tobacco use. PHYSICAL EXAM     INITIAL VITALS:  height is 5' 3\" (1.6 m) and weight is 250 lb (113.4 kg). Her oral temperature is 97.2 °F (36.2 °C). Her blood pressure is 125/87 and her pulse is 94.  Her respiration is 14 and oxygen saturation is 97%. CONSTITUTIONAL: AOx4, no apparent distress, appears stated age    HEAD: normocephalic, atraumatic   EYES: PERRLA, EOMI    ENT: moist mucous membranes, uvula midline, no JVD   NECK: supple, symmetric   BACK: symmetric   LUNGS: clear to auscultation bilaterally   CARDIOVASCULAR: regular rate and rhythm, no murmurs, rubs or gallops   ABDOMEN: soft, non-tender, non-distended with normal active bowel sounds   NEUROLOGIC:  MAEx4, no focal sensory or motor deficits   MUSCULOSKELETAL: no clubbing, cyanosis or edema   SKIN: no rash or wounds       DIFFERENTIAL DIAGNOSIS/MDM:   Chest Pain:  DDX: Emergent: ACS/NSTEMI/STEMI/angina, arrhythmia, aortic dissection,   Cocaine use  Nonemergent: pneumonia, pericarditis, GERD, MSK, Endocarditis, anxiety  Evaluated for: diaphoresis, present chest pain, tachypnea, BP both arms, heart sounds, JVD, tender chest wall, wheezing    DIAGNOSTIC RESULTS     EKG: All EKG's are interpreted by the Observation Physician who either signs or Co-signs this chart in the absence of a cardiologist.    EKG Interpretation    Interpreted by observation physician    Rhythm: normal sinus   Rate: normal  Axis: normal  Ectopy: none  Conduction: normal  ST Segments: no acute change  T Waves: no acute change  Q Waves: none    Clinical Impression: no acute changes    Ignacia Steve DO      RADIOLOGY:   I directly visualized the following  images and reviewed the radiologist interpretations:    XR CHEST (2 VW)    Result Date: 8/11/2021  EXAMINATION: TWO XRAY VIEWS OF THE CHEST 8/11/2021 2:01 am COMPARISON: Chest two views June 27, 2015. HISTORY: ORDERING SYSTEM PROVIDED HISTORY: upper left chest pain TECHNOLOGIST PROVIDED HISTORY: upper left chest pain FINDINGS: The heart is normal in size and configuration. The mediastinal contours are within normal limits. The lungs are well aerated. The pleural surfaces are normal and no evidence of a pleural effusion is seen.  Bones and soft tissues are unremarkable. Unremarkable radiographic views of the chest.       LABS:  I have reviewed and interpreted all available lab results. Labs Reviewed   CBC WITH AUTO DIFFERENTIAL - Abnormal; Notable for the following components:       Result Value    Immature Granulocytes 1 (*)     All other components within normal limits   BASIC METABOLIC PANEL W/ REFLEX TO MG FOR LOW K - Abnormal; Notable for the following components:    Glucose 132 (*)     All other components within normal limits   HCG, SERUM, QUALITATIVE   TROPONIN   TROPONIN       SCREENING TOOLS:    HEART Risk Score for Chest Pain Patients   History and Physical Exam Suspicion Level  (Nausea, Vomiting, Diaphoresis, Radiation, Exertion)   Slightly Suspicious (0 pts)   Moderately Suspicious (1 pt)   Highly Suspicious (2 pts)   EKG Interpretation   Normal (0 pts)   Non-Specific Repolarization Disturbance (1 pt)   Significant ST-Depression (2 pts)   Age of Patient (in years)   = 39 (0 pts)   46-64 (1 pt)   = 65 (2 pts)   Risk Factors   No Risk Factors (0 pts)   1-2 Risk Factors (1 pt)   = 3 Risk Factors (2 pts)   Risk Factors Include:   Hypercholesterolemia   Hypertension   Diabetes Mellitus   Cigarette smoking   Positive family history   Obesity   CAD   (SLE, CKDz, HIV, Cocaine abuse)   Troponin Levels   = Normal Limit (0 pts)   1-3 Times Normal Limit (1 pt)   > 3 Times Normal Limit (2 pts)  TOTAL:    Percent Risk for Major Adverse Cardiac Event (MACE)  0-3 pts indicates low risk for MACE   2.5% (DISCHARGE)   4-7 pts indicates moderate risk for MACE  20.3% (OBS)  8-10 pts indicates high risk for MACE  72.7% (EARLY INVASIVE TX)    CDU IMPRESSION / Blanche Swan is a 33 y.o. female who presents with atypical chest pain with a history of hyperlipidemia, smoking, significant family history. Patient did not improve with therapy in the emergency department and was admitted to the observation unit for further evaluation by cardiology.     Atypical chest pain    · Cardiology evaluation  Stress test will be done on an outpatient basis. Continue home medicines Fenofibrate and Atorvastatin  Follow up in two weeks  · Continue home medications and pain control  · Monitor vitals, labs, and imaging  · DISPO: pending consults and clinical improvement    CONSULTS:    IP CONSULT TO CARDIOLOGY    PROCEDURES:  Not indicated      --  Yarelis Silva DO   Emergency Medicine Resident     This dictation was generated by voice recognition computer software. Although all attempts are made to edit the dictation for accuracy, there may be errors in the transcription that are not intended.

## 2021-08-11 NOTE — DISCHARGE SUMMARY
CDU Discharge Summary        Patient:  Ana Christensen  YOB: 1990    MRN: [id number]   Acct: [de-identified]    Primary Care Physician: HILDA Chavez CNP    Admit date:  8/11/2021  1:32 AM  Discharge date: 8/11/2021 11:31 AM      Discharge Diagnoses:     1.)  Chest pain, atypical, evaluated for cardiac etiology by cardiology attending. Patient felt to be appropriate for outpatient work-up and follow through    Follow-up:  Call today/tomorrow for a follow up appointment with HILDA Chavez CNP , or return to the Emergency Room with worsening symptoms    Stressed to patient the importance of following up with primary care doctor for further workup/management of symptoms. Pt verbalizes understanding and agrees with plan. Discharge Medication Changes:       Medication List      CONTINUE taking these medications    acetaminophen 325 MG tablet  Commonly known as: Tylenol  Take 1 tablet by mouth every 6 hours as needed for Pain     ATORVASTATIN CALCIUM PO     fenofibrate 160 MG tablet  Commonly known as: TRIGLIDE     * ibuprofen 800 MG tablet  Commonly known as: ADVIL;MOTRIN  Take 1 tablet by mouth every 8 hours as needed for Pain     * ibuprofen 600 MG tablet  Commonly known as: IBU  Take 1 tablet by mouth every 6 hours as needed for Pain         * This list has 2 medication(s) that are the same as other medications prescribed for you. Read the directions carefully, and ask your doctor or other care provider to review them with you.                 Diet:  No diet orders on file, advance as tolerated     Activity:  As tolerated    Consultants: IP CONSULT TO CARDIOLOGY    Procedures:  Not indicated      Diagnostic Test:   Results for orders placed or performed during the hospital encounter of 08/11/21   CBC Auto Differential   Result Value Ref Range    WBC 7.7 3.5 - 11.3 k/uL    RBC 4.48 3.95 - 5.11 m/uL    Hemoglobin 13.3 11.9 - 15.1 g/dL    Hematocrit 40.9 36.3 - 47.1 %    MCV 91.3 82.6 8/11/2021  EXAMINATION: TWO XRAY VIEWS OF THE CHEST 8/11/2021 2:01 am COMPARISON: Chest two views June 27, 2015. HISTORY: ORDERING SYSTEM PROVIDED HISTORY: upper left chest pain TECHNOLOGIST PROVIDED HISTORY: upper left chest pain FINDINGS: The heart is normal in size and configuration. The mediastinal contours are within normal limits. The lungs are well aerated. The pleural surfaces are normal and no evidence of a pleural effusion is seen. Bones and soft tissues are unremarkable. Unremarkable radiographic views of the chest.           Physical Exam:    General appearance - NAD, AOx 3    Lungs -CTAB, no R/R/R  Heart - RRR, no M/R/G  Abdomen - Soft, NT/ND  Neurological:  MAEx4, No focal motor deficit, sensory loss  Extremities - Cap refil <2 sec in all ext., no edema  Skin -warm, dry      Hospital Course:  Clinical course has improved, labs and imaging reviewed. Dakota Meyer originally presented to the hospital on 8/11/2021  1:32 AM with chest pain. At that time it was determined that She required further observation and cardiology evaluation. Enzymes and EKG negative. Patient felt appropriate for outpatient follow-up. Patient had no further needs inpatient. Patient was subsequent discharged in good condition. . Labs and imaging were followed daily. Imaging results as above. She is medically stable to be discharged. Disposition: Home    Patient stated that they will not drive themselves home from the hospital if they have gotten pain killers/ narcotics earlier that day and that they will arrange for transportation on their own or work with the  for a ride. Patient counseled NOT to drive while under the influence of narcotics/ pain killers. Condition: Good    Patient stable and ready for discharge home. I have discussed plan of care with patient and they are in understanding. They were instructed to read discharge paperwork.  All of their questions and concerns were addressed. Time Spent: 0 day      --  Rubi Silvestre MD  Emergency Medicine Attending Physician    This dictation was generated by voice recognition computer software. Although all attempts are made to edit the dictation for accuracy, there may be errors in the transcription that are not intended.

## 2021-08-11 NOTE — ED NOTES
Pt came to ED after having increased and sudden chest pain that started while pr was taking a shower on 8/10/21. Pt states that she has no cardiac history but family does.  Pt is a&o x4 in NAD and ambulatory      Naa Beckwith RN  08/11/21 [id number]

## 2021-08-11 NOTE — CARE COORDINATION
Case Management Initial Discharge Plan  Azam Cifuentes,             Met with:patient & mom to discuss discharge plans on obs unit. Information verified: address, contacts, phone number, , insurance Yes  Insurance Provider: [facility]    Emergency Contact/Next of Kin name & number: grandparent ra  Who are involved in patient's support system? Family     PCP: HILDA Gonzalez CNP  Date of last visit: 2 months      Discharge Planning    Living Arrangements:    lives with s.o. Home has 1 stories      Patient able to perform ADL's:Independent    Current Services (outpatient & in home) none  DME equipment: none      Is patient receiving oral anticoagulation therapy? No    Potential Assistance Needed:   f/u pcp         Evaluation: no    Expected Discharge date:       Patient expects to be discharged to:   home    If home: is the family and/or caregiver wiling & able to provide support at home? yes  Who will be providing this support? S.o         Transportation provider: mom  Transportation arrangements needed for discharge: No    Readmission Risk              Risk of Unplanned Readmission:  0             Does patient have a readmission risk score greater than 14?: No  If yes, follow-up appointment must be made within 7 days of discharge.      Goals of Care: safe transition plan     Educated yes on transitional options, provided freedom of choice and are agreeable with plan      Discharge Plan:  Return home independently            Electronically signed by Kelsey Ortega RN on 21 at 11:25 AM EDT

## 2021-08-12 LAB
EKG ATRIAL RATE: 92 BPM
EKG P AXIS: 42 DEGREES
EKG P-R INTERVAL: 150 MS
EKG Q-T INTERVAL: 358 MS
EKG QRS DURATION: 88 MS
EKG QTC CALCULATION (BAZETT): 442 MS
EKG R AXIS: 62 DEGREES
EKG T AXIS: 49 DEGREES
EKG VENTRICULAR RATE: 92 BPM

## 2023-05-05 ENCOUNTER — OFFICE VISIT (OUTPATIENT)
Dept: OBGYN CLINIC | Age: 33
End: 2023-05-05
Payer: COMMERCIAL

## 2023-05-05 ENCOUNTER — HOSPITAL ENCOUNTER (OUTPATIENT)
Age: 33
Setting detail: SPECIMEN
Discharge: HOME | End: 2023-05-05

## 2023-05-05 VITALS
HEART RATE: 98 BPM | WEIGHT: 238 LBS | BODY MASS INDEX: 42.17 KG/M2 | HEIGHT: 63 IN | SYSTOLIC BLOOD PRESSURE: 134 MMHG | DIASTOLIC BLOOD PRESSURE: 82 MMHG

## 2023-05-05 DIAGNOSIS — F17.200 SMOKER: ICD-10-CM

## 2023-05-05 DIAGNOSIS — N92.6 IRREGULAR MENSES: Primary | ICD-10-CM

## 2023-05-05 DIAGNOSIS — N92.6 IRREGULAR MENSES: ICD-10-CM

## 2023-05-05 DIAGNOSIS — Z31.69 INFERTILITY COUNSELING: ICD-10-CM

## 2023-05-05 PROCEDURE — G8427 DOCREV CUR MEDS BY ELIG CLIN: HCPCS | Performed by: SPECIALIST

## 2023-05-05 PROCEDURE — G8417 CALC BMI ABV UP PARAM F/U: HCPCS | Performed by: SPECIALIST

## 2023-05-05 PROCEDURE — 36415 COLL VENOUS BLD VENIPUNCTURE: CPT | Performed by: SPECIALIST

## 2023-05-05 PROCEDURE — 99203 OFFICE O/P NEW LOW 30 MIN: CPT | Performed by: SPECIALIST

## 2023-05-05 PROCEDURE — 4004F PT TOBACCO SCREEN RCVD TLK: CPT | Performed by: SPECIALIST

## 2023-05-05 RX ORDER — FENOFIBRATE 145 MG/1
145 TABLET, COATED ORAL EVERY MORNING
COMMUNITY
Start: 2023-03-07

## 2023-05-05 RX ORDER — ATORVASTATIN CALCIUM 40 MG/1
40 TABLET, FILM COATED ORAL EVERY MORNING
COMMUNITY
Start: 2023-04-19

## 2023-05-05 RX ORDER — ALBUTEROL SULFATE 90 UG/1
2 AEROSOL, METERED RESPIRATORY (INHALATION) EVERY 4 HOURS PRN
COMMUNITY
Start: 2022-01-07

## 2023-05-05 RX ORDER — CETIRIZINE HYDROCHLORIDE 10 MG/1
10 TABLET ORAL EVERY MORNING
COMMUNITY
Start: 2023-04-19

## 2023-05-05 SDOH — ECONOMIC STABILITY: FOOD INSECURITY: WITHIN THE PAST 12 MONTHS, YOU WORRIED THAT YOUR FOOD WOULD RUN OUT BEFORE YOU GOT MONEY TO BUY MORE.: NEVER TRUE

## 2023-05-05 SDOH — ECONOMIC STABILITY: HOUSING INSECURITY
IN THE LAST 12 MONTHS, WAS THERE A TIME WHEN YOU DID NOT HAVE A STEADY PLACE TO SLEEP OR SLEPT IN A SHELTER (INCLUDING NOW)?: NO

## 2023-05-05 SDOH — ECONOMIC STABILITY: INCOME INSECURITY: HOW HARD IS IT FOR YOU TO PAY FOR THE VERY BASICS LIKE FOOD, HOUSING, MEDICAL CARE, AND HEATING?: NOT HARD AT ALL

## 2023-05-05 SDOH — ECONOMIC STABILITY: FOOD INSECURITY: WITHIN THE PAST 12 MONTHS, THE FOOD YOU BOUGHT JUST DIDN'T LAST AND YOU DIDN'T HAVE MONEY TO GET MORE.: NEVER TRUE

## 2023-05-05 ASSESSMENT — PATIENT HEALTH QUESTIONNAIRE - PHQ9
2. FEELING DOWN, DEPRESSED OR HOPELESS: 0
SUM OF ALL RESPONSES TO PHQ QUESTIONS 1-9: 0
SUM OF ALL RESPONSES TO PHQ9 QUESTIONS 1 & 2: 0
1. LITTLE INTEREST OR PLEASURE IN DOING THINGS: 0
SUM OF ALL RESPONSES TO PHQ QUESTIONS 1-9: 0

## 2023-05-05 ASSESSMENT — ENCOUNTER SYMPTOMS
EYE PAIN: 0
COUGH: 0
ABDOMINAL DISTENTION: 0
VOMITING: 0
CONSTIPATION: 0
APNEA: 0
DIARRHEA: 0
NAUSEA: 0
ABDOMINAL PAIN: 0

## 2023-05-06 LAB
FOLLICLE STIMULATING HORMONE: 6.5 MIU/ML (ref 1.7–21.5)
LH: 10.3 MIU/ML (ref 1–95.6)
TESTOST SERPL-MCNC: 54 NG/DL (ref 20–70)
TSH SERPL-ACNC: 1.52 UIU/ML (ref 0.3–5)

## 2023-05-08 PROBLEM — Z31.69 INFERTILITY COUNSELING: Status: ACTIVE | Noted: 2023-05-08

## 2023-05-08 PROBLEM — F17.200 SMOKER: Status: ACTIVE | Noted: 2023-05-08

## 2023-05-08 PROBLEM — N92.6 IRREGULAR MENSES: Status: ACTIVE | Noted: 2023-05-08

## 2023-05-25 ENCOUNTER — OFFICE VISIT (OUTPATIENT)
Dept: OBGYN CLINIC | Age: 33
End: 2023-05-25
Payer: COMMERCIAL

## 2023-05-25 VITALS
SYSTOLIC BLOOD PRESSURE: 118 MMHG | HEART RATE: 86 BPM | WEIGHT: 238 LBS | DIASTOLIC BLOOD PRESSURE: 82 MMHG | BODY MASS INDEX: 42.17 KG/M2 | HEIGHT: 63 IN

## 2023-05-25 DIAGNOSIS — N92.6 IRREGULAR BLEEDING: ICD-10-CM

## 2023-05-25 DIAGNOSIS — E28.2 PCO (POLYCYSTIC OVARIES): Primary | ICD-10-CM

## 2023-05-25 PROCEDURE — 99213 OFFICE O/P EST LOW 20 MIN: CPT | Performed by: SPECIALIST

## 2023-05-25 PROCEDURE — G8417 CALC BMI ABV UP PARAM F/U: HCPCS | Performed by: SPECIALIST

## 2023-05-25 PROCEDURE — G8427 DOCREV CUR MEDS BY ELIG CLIN: HCPCS | Performed by: SPECIALIST

## 2023-05-25 PROCEDURE — 4004F PT TOBACCO SCREEN RCVD TLK: CPT | Performed by: SPECIALIST

## 2023-05-25 RX ORDER — MEDROXYPROGESTERONE ACETATE 10 MG/1
10 TABLET ORAL DAILY
Qty: 14 TABLET | Refills: 3 | Status: SHIPPED | OUTPATIENT
Start: 2023-05-25 | End: 2023-06-08

## 2023-05-25 ASSESSMENT — ENCOUNTER SYMPTOMS
APNEA: 0
COUGH: 0
VOMITING: 0
EYE PAIN: 0
ABDOMINAL PAIN: 0
NAUSEA: 0
CONSTIPATION: 0
DIARRHEA: 0
ABDOMINAL DISTENTION: 0

## 2023-05-25 NOTE — PROGRESS NOTES
Subjective:      Patient ID: Christiano Khalil is a 26 y.o. female. Chief Complaint   Patient presents with    Results     /82 (Site: Left Upper Arm, Position: Sitting, Cuff Size: Medium Adult)   Pulse 86   Ht 5' 3\" (1.6 m)   Wt 238 lb (108 kg)   LMP 03/15/2023   Breastfeeding No   BMI 42.16 kg/m²   Patient's last menstrual period was 03/15/2023.     Past Medical History:   Diagnosis Date    Depression     No psych treatment or therapy currently, has been seen by psych in the past    Herpes     type 1 and 2     PCOS (polycystic ovarian syndrome)     PID 2014 : Treated with out patient therapy (rocephin, doxy, flagyl). Seen at [facility] ED.     STD (female)     gonorrhea, trich, chlamydia  - all treated     Current Outpatient Medications Ordered in Epic   Medication Sig Dispense Refill    medroxyPROGESTERone (PROVERA) 10 MG tablet Take 1 tablet by mouth daily for 14 days 14 tablet 3    clomiPHENE (CLOMID) 50 MG tablet Take 1 tablet by mouth daily for 5 days TAKE ONE PILL DAILY STARTING ON THE THIRD DAY OF YOUR CYCLE FOR FIVE DAYS 5 tablet 2    albuterol sulfate HFA (PROVENTIL;VENTOLIN;PROAIR) 108 (90 Base) MCG/ACT inhaler Inhale 2 puffs into the lungs every 4 hours as needed      atorvastatin (LIPITOR) 40 MG tablet Take 1 tablet by mouth every morning      cetirizine (ZYRTEC) 10 MG tablet Take 1 tablet by mouth every morning      fenofibrate (TRICOR) 145 MG tablet Take 1 tablet by mouth every morning      acetaminophen (TYLENOL) 325 MG tablet Take 1 tablet by mouth every 6 hours as needed for Pain 15 tablet 0    ibuprofen (IBU) 600 MG tablet Take 1 tablet by mouth every 6 hours as needed for Pain 120 tablet 0     No current Epic-ordered facility-administered medications on file.      Problem List Items Addressed This Visit       Irregular bleeding    PCO (polycystic ovaries) - Primary     Allergies   Allergen Reactions    Amoxicillin     Benadryl [Diphenhydramine]      No

## 2023-10-09 ENCOUNTER — HOSPITAL ENCOUNTER (EMERGENCY)
Age: 33
Discharge: HOME | End: 2023-10-10
Attending: EMERGENCY MEDICINE
Payer: COMMERCIAL

## 2023-10-09 DIAGNOSIS — R06.02 SHORTNESS OF BREATH: ICD-10-CM

## 2023-10-09 DIAGNOSIS — T78.40XA ALLERGY, INITIAL ENCOUNTER: Primary | ICD-10-CM

## 2023-10-09 LAB
BASOPHILS # BLD: 0.04 K/UL (ref 0–0.2)
BASOPHILS NFR BLD: 1 % (ref 0–2)
EOSINOPHIL # BLD: 0.24 K/UL (ref 0–0.44)
EOSINOPHILS RELATIVE PERCENT: 3 % (ref 1–4)
ERYTHROCYTE [DISTWIDTH] IN BLOOD BY AUTOMATED COUNT: 12.1 % (ref 11.8–14.4)
HCT VFR BLD AUTO: 43.3 % (ref 36.3–47.1)
HGB BLD-MCNC: 14.2 G/DL (ref 11.9–15.1)
IMM GRANULOCYTES # BLD AUTO: 0.03 K/UL (ref 0–0.3)
IMM GRANULOCYTES NFR BLD: 0 %
LYMPHOCYTES NFR BLD: 2.6 K/UL (ref 1.1–3.7)
LYMPHOCYTES RELATIVE PERCENT: 31 % (ref 24–43)
MCH RBC QN AUTO: 30.3 PG (ref 25.2–33.5)
MCHC RBC AUTO-ENTMCNC: 32.8 G/DL (ref 28.4–34.8)
MCV RBC AUTO: 92.5 FL (ref 82.6–102.9)
MONOCYTES NFR BLD: 0.68 K/UL (ref 0.1–1.2)
MONOCYTES NFR BLD: 8 % (ref 3–12)
NEUTROPHILS NFR BLD: 57 % (ref 36–65)
NEUTS SEG NFR BLD: 4.94 K/UL (ref 1.5–8.1)
NRBC BLD-RTO: 0 PER 100 WBC
PLATELET # BLD AUTO: 246 K/UL (ref 138–453)
PMV BLD AUTO: 10.8 FL (ref 8.1–13.5)
RBC # BLD AUTO: 4.68 M/UL (ref 3.95–5.11)
WBC OTHER # BLD: 8.5 K/UL (ref 3.5–11.3)

## 2023-10-09 PROCEDURE — 84703 CHORIONIC GONADOTROPIN ASSAY: CPT

## 2023-10-09 PROCEDURE — 93005 ELECTROCARDIOGRAM TRACING: CPT

## 2023-10-09 PROCEDURE — 99284 EMERGENCY DEPT VISIT MOD MDM: CPT | Performed by: EMERGENCY MEDICINE

## 2023-10-09 PROCEDURE — 85379 FIBRIN DEGRADATION QUANT: CPT

## 2023-10-09 PROCEDURE — 84443 ASSAY THYROID STIM HORMONE: CPT

## 2023-10-09 PROCEDURE — 85025 COMPLETE CBC W/AUTO DIFF WBC: CPT

## 2023-10-09 PROCEDURE — 6370000000 HC RX 637 (ALT 250 FOR IP)

## 2023-10-09 PROCEDURE — 80048 BASIC METABOLIC PNL TOTAL CA: CPT

## 2023-10-09 RX ORDER — CETIRIZINE HYDROCHLORIDE 10 MG/1
10 TABLET ORAL DAILY
Status: DISCONTINUED | OUTPATIENT
Start: 2023-10-10 | End: 2023-10-09

## 2023-10-09 RX ORDER — CETIRIZINE HYDROCHLORIDE 10 MG/1
10 TABLET ORAL ONCE
Status: COMPLETED | OUTPATIENT
Start: 2023-10-10 | End: 2023-10-09

## 2023-10-09 RX ADMIN — CETIRIZINE HYDROCHLORIDE 10 MG: 10 TABLET ORAL at 23:57

## 2023-10-10 VITALS
SYSTOLIC BLOOD PRESSURE: 128 MMHG | DIASTOLIC BLOOD PRESSURE: 88 MMHG | TEMPERATURE: 97 F | WEIGHT: 246.47 LBS | BODY MASS INDEX: 43.66 KG/M2 | OXYGEN SATURATION: 99 % | HEART RATE: 96 BPM | RESPIRATION RATE: 20 BRPM

## 2023-10-10 LAB
ANION GAP SERPL CALCULATED.3IONS-SCNC: 11 MMOL/L (ref 9–17)
BUN SERPL-MCNC: 12 MG/DL (ref 6–20)
CALCIUM SERPL-MCNC: 9.3 MG/DL (ref 8.6–10.4)
CHLORIDE SERPL-SCNC: 104 MMOL/L (ref 98–107)
CO2 SERPL-SCNC: 24 MMOL/L (ref 20–31)
CREAT SERPL-MCNC: 0.6 MG/DL (ref 0.5–0.9)
D DIMER PPP FEU-MCNC: 0.29 UG/ML FEU (ref 0–0.57)
EKG ATRIAL RATE: 103 BPM
EKG P AXIS: 30 DEGREES
EKG P-R INTERVAL: 136 MS
EKG Q-T INTERVAL: 338 MS
EKG QRS DURATION: 80 MS
EKG QTC CALCULATION (BAZETT): 442 MS
EKG R AXIS: 64 DEGREES
EKG T AXIS: 51 DEGREES
EKG VENTRICULAR RATE: 103 BPM
GFR SERPL CREATININE-BSD FRML MDRD: >60 ML/MIN/1.73M2
GLUCOSE SERPL-MCNC: 112 MG/DL (ref 70–99)
HCG SERPL QL: NEGATIVE
POTASSIUM SERPL-SCNC: 3.9 MMOL/L (ref 3.7–5.3)
SODIUM SERPL-SCNC: 139 MMOL/L (ref 135–144)
TSH SERPL DL<=0.05 MIU/L-ACNC: 2.46 UIU/ML (ref 0.3–5)

## 2023-10-10 PROCEDURE — 93010 ELECTROCARDIOGRAM REPORT: CPT | Performed by: INTERNAL MEDICINE

## 2023-10-10 RX ORDER — ALBUTEROL SULFATE 90 UG/1
2 AEROSOL, METERED RESPIRATORY (INHALATION) EVERY 6 HOURS PRN
Qty: 18 G | Refills: 0 | Status: SHIPPED | OUTPATIENT
Start: 2023-10-10

## 2023-10-10 ASSESSMENT — ENCOUNTER SYMPTOMS
NAUSEA: 0
ABDOMINAL PAIN: 0
SHORTNESS OF BREATH: 1
COUGH: 1
DIARRHEA: 0
VOMITING: 0
RHINORRHEA: 1
EYE ITCHING: 0

## 2023-10-10 NOTE — DISCHARGE INSTRUCTIONS
Evaluated [address] [facility] emergency department for shortness of breath. Some lab work was done which was unremarkable. A prescription for an inhaler as well as allergy medicine/decongestant was sent to your pharmacy. Please return to the ED with new or worsening symptoms including shortness of breath, chest pain, headedness, passing out. Follow-up with your PCP at the earliest possible time by calling to schedule an appointment.

## 2023-10-10 NOTE — ED NOTES
Pt to ED with c/o SOB. Pt states she has allergies and tonight she began sneezing multiple times in a row and felt as if she were unable to catch her breath after that. Pt states she then began having a panic attack. Pt arrives tachy, however denies chest pain. Pt reports a productive cough with clear sputum.  Pt denies fever/chills      Amari Mendez RN  10/10/23 0005